# Patient Record
Sex: FEMALE | Race: WHITE | NOT HISPANIC OR LATINO | Employment: OTHER | URBAN - METROPOLITAN AREA
[De-identification: names, ages, dates, MRNs, and addresses within clinical notes are randomized per-mention and may not be internally consistent; named-entity substitution may affect disease eponyms.]

---

## 2019-12-10 ENCOUNTER — HOSPITAL ENCOUNTER (OUTPATIENT)
Facility: CLINIC | Age: 64
Setting detail: OBSERVATION
Discharge: HOME | End: 2019-12-11
Attending: EMERGENCY MEDICINE | Admitting: STUDENT IN AN ORGANIZED HEALTH CARE EDUCATION/TRAINING PROGRAM
Payer: COMMERCIAL

## 2019-12-10 ENCOUNTER — APPOINTMENT (OUTPATIENT)
Dept: CT IMAGING | Facility: CLINIC | Age: 64
End: 2019-12-10
Attending: EMERGENCY MEDICINE
Payer: COMMERCIAL

## 2019-12-10 DIAGNOSIS — J44.1 COPD EXACERBATION (H): Primary | ICD-10-CM

## 2019-12-10 DIAGNOSIS — J45.41 MODERATE PERSISTENT ASTHMA WITH EXACERBATION: ICD-10-CM

## 2019-12-10 DIAGNOSIS — K59.00 CONSTIPATION, UNSPECIFIED CONSTIPATION TYPE: ICD-10-CM

## 2019-12-10 DIAGNOSIS — J96.01 ACUTE RESPIRATORY FAILURE WITH HYPOXIA (H): ICD-10-CM

## 2019-12-10 DIAGNOSIS — R00.0 TACHYCARDIA: ICD-10-CM

## 2019-12-10 PROBLEM — Z86.73 HISTORY OF CVA (CEREBROVASCULAR ACCIDENT): Status: ACTIVE | Noted: 2019-12-10

## 2019-12-10 PROBLEM — C85.90 NON-HODGKIN LYMPHOMA (H): Status: ACTIVE | Noted: 2019-12-10

## 2019-12-10 PROBLEM — M32.14: Status: ACTIVE | Noted: 2019-12-10

## 2019-12-10 PROBLEM — M06.9 RHEUMATOID ARTHRITIS INVOLVING MULTIPLE SITES (H): Status: ACTIVE | Noted: 2017-06-14

## 2019-12-10 PROBLEM — G93.9 BRAIN LESION: Status: ACTIVE | Noted: 2017-03-26

## 2019-12-10 PROBLEM — Z98.890 S/P COLONOSCOPY WITH POLYPECTOMY: Status: ACTIVE | Noted: 2017-03-16

## 2019-12-10 PROBLEM — E66.01 MORBID OBESITY (H): Status: ACTIVE | Noted: 2017-03-27

## 2019-12-10 PROBLEM — N18.30 CKD (CHRONIC KIDNEY DISEASE) STAGE 3, GFR 30-59 ML/MIN (H): Status: ACTIVE | Noted: 2017-08-03

## 2019-12-10 PROBLEM — Z79.52 CURRENT CHRONIC USE OF SYSTEMIC STEROIDS: Status: ACTIVE | Noted: 2017-08-03

## 2019-12-10 LAB
ANION GAP SERPL CALCULATED.3IONS-SCNC: 4 MMOL/L (ref 3–14)
BASOPHILS # BLD AUTO: 0 10E9/L (ref 0–0.2)
BASOPHILS NFR BLD AUTO: 0.3 %
BUN SERPL-MCNC: 19 MG/DL (ref 7–30)
CALCIUM SERPL-MCNC: 9.8 MG/DL (ref 8.5–10.1)
CHLORIDE SERPL-SCNC: 101 MMOL/L (ref 94–109)
CO2 SERPL-SCNC: 29 MMOL/L (ref 20–32)
CREAT BLD-MCNC: 1 MG/DL (ref 0.52–1.04)
CREAT SERPL-MCNC: 0.94 MG/DL (ref 0.52–1.04)
DIFFERENTIAL METHOD BLD: ABNORMAL
EOSINOPHIL # BLD AUTO: 0.2 10E9/L (ref 0–0.7)
EOSINOPHIL NFR BLD AUTO: 1.5 %
ERYTHROCYTE [DISTWIDTH] IN BLOOD BY AUTOMATED COUNT: 16.2 % (ref 10–15)
GFR SERPL CREATININE-BSD FRML MDRD: 56 ML/MIN/{1.73_M2}
GFR SERPL CREATININE-BSD FRML MDRD: 64 ML/MIN/{1.73_M2}
GLUCOSE SERPL-MCNC: 103 MG/DL (ref 70–99)
HCT VFR BLD AUTO: 40.9 % (ref 35–47)
HGB BLD-MCNC: 12.9 G/DL (ref 11.7–15.7)
IMM GRANULOCYTES # BLD: 0 10E9/L (ref 0–0.4)
IMM GRANULOCYTES NFR BLD: 0.2 %
INR PPP: 2.49 (ref 0.86–1.14)
INTERPRETATION ECG - MUSE: NORMAL
LYMPHOCYTES # BLD AUTO: 1.2 10E9/L (ref 0.8–5.3)
LYMPHOCYTES NFR BLD AUTO: 10.6 %
MCH RBC QN AUTO: 26.7 PG (ref 26.5–33)
MCHC RBC AUTO-ENTMCNC: 31.5 G/DL (ref 31.5–36.5)
MCV RBC AUTO: 85 FL (ref 78–100)
MONOCYTES # BLD AUTO: 0.5 10E9/L (ref 0–1.3)
MONOCYTES NFR BLD AUTO: 4.5 %
NEUTROPHILS # BLD AUTO: 9.1 10E9/L (ref 1.6–8.3)
NEUTROPHILS NFR BLD AUTO: 82.9 %
NRBC # BLD AUTO: 0 10*3/UL
NRBC BLD AUTO-RTO: 0 /100
NT-PROBNP SERPL-MCNC: 106 PG/ML (ref 0–900)
PLATELET # BLD AUTO: 253 10E9/L (ref 150–450)
POTASSIUM SERPL-SCNC: 4.1 MMOL/L (ref 3.4–5.3)
RBC # BLD AUTO: 4.84 10E12/L (ref 3.8–5.2)
SODIUM SERPL-SCNC: 134 MMOL/L (ref 133–144)
TROPONIN I SERPL-MCNC: 0.04 UG/L (ref 0–0.04)
WBC # BLD AUTO: 10.9 10E9/L (ref 4–11)

## 2019-12-10 PROCEDURE — G0378 HOSPITAL OBSERVATION PER HR: HCPCS

## 2019-12-10 PROCEDURE — 25000125 ZZHC RX 250: Performed by: STUDENT IN AN ORGANIZED HEALTH CARE EDUCATION/TRAINING PROGRAM

## 2019-12-10 PROCEDURE — 25800030 ZZH RX IP 258 OP 636: Performed by: EMERGENCY MEDICINE

## 2019-12-10 PROCEDURE — 85610 PROTHROMBIN TIME: CPT | Performed by: EMERGENCY MEDICINE

## 2019-12-10 PROCEDURE — 25000132 ZZH RX MED GY IP 250 OP 250 PS 637: Mod: GY | Performed by: STUDENT IN AN ORGANIZED HEALTH CARE EDUCATION/TRAINING PROGRAM

## 2019-12-10 PROCEDURE — 96375 TX/PRO/DX INJ NEW DRUG ADDON: CPT

## 2019-12-10 PROCEDURE — 83880 ASSAY OF NATRIURETIC PEPTIDE: CPT | Performed by: EMERGENCY MEDICINE

## 2019-12-10 PROCEDURE — 94640 AIRWAY INHALATION TREATMENT: CPT | Mod: 76

## 2019-12-10 PROCEDURE — 40000275 ZZH STATISTIC RCP TIME EA 10 MIN

## 2019-12-10 PROCEDURE — 25000128 H RX IP 250 OP 636: Performed by: EMERGENCY MEDICINE

## 2019-12-10 PROCEDURE — 85025 COMPLETE CBC W/AUTO DIFF WBC: CPT | Performed by: EMERGENCY MEDICINE

## 2019-12-10 PROCEDURE — 93005 ELECTROCARDIOGRAM TRACING: CPT

## 2019-12-10 PROCEDURE — 84484 ASSAY OF TROPONIN QUANT: CPT | Performed by: STUDENT IN AN ORGANIZED HEALTH CARE EDUCATION/TRAINING PROGRAM

## 2019-12-10 PROCEDURE — 94640 AIRWAY INHALATION TREATMENT: CPT

## 2019-12-10 PROCEDURE — 71260 CT THORAX DX C+: CPT

## 2019-12-10 PROCEDURE — 25000132 ZZH RX MED GY IP 250 OP 250 PS 637: Mod: GY | Performed by: HOSPITALIST

## 2019-12-10 PROCEDURE — 25000125 ZZHC RX 250: Performed by: EMERGENCY MEDICINE

## 2019-12-10 PROCEDURE — 99285 EMERGENCY DEPT VISIT HI MDM: CPT | Mod: 25

## 2019-12-10 PROCEDURE — 82565 ASSAY OF CREATININE: CPT | Mod: 91

## 2019-12-10 PROCEDURE — 80048 BASIC METABOLIC PNL TOTAL CA: CPT | Performed by: EMERGENCY MEDICINE

## 2019-12-10 PROCEDURE — 96365 THER/PROPH/DIAG IV INF INIT: CPT | Mod: 59

## 2019-12-10 PROCEDURE — 99220 ZZC INITIAL OBSERVATION CARE,LEVL III: CPT | Performed by: STUDENT IN AN ORGANIZED HEALTH CARE EDUCATION/TRAINING PROGRAM

## 2019-12-10 PROCEDURE — 84484 ASSAY OF TROPONIN QUANT: CPT | Performed by: EMERGENCY MEDICINE

## 2019-12-10 RX ORDER — CHOLECALCIFEROL (VITAMIN D3) 50 MCG
2000 TABLET ORAL DAILY
COMMUNITY

## 2019-12-10 RX ORDER — LISINOPRIL 2.5 MG/1
2.5 TABLET ORAL DAILY
Status: DISCONTINUED | OUTPATIENT
Start: 2019-12-11 | End: 2019-12-11 | Stop reason: HOSPADM

## 2019-12-10 RX ORDER — IPRATROPIUM BROMIDE AND ALBUTEROL SULFATE 2.5; .5 MG/3ML; MG/3ML
3 SOLUTION RESPIRATORY (INHALATION)
Status: COMPLETED | OUTPATIENT
Start: 2019-12-10 | End: 2019-12-10

## 2019-12-10 RX ORDER — LEVOTHYROXINE SODIUM 88 UG/1
88 TABLET ORAL DAILY
Status: DISCONTINUED | OUTPATIENT
Start: 2019-12-11 | End: 2019-12-11 | Stop reason: HOSPADM

## 2019-12-10 RX ORDER — ACETAMINOPHEN 325 MG/1
325-650 TABLET ORAL EVERY 6 HOURS PRN
Status: DISCONTINUED | OUTPATIENT
Start: 2019-12-10 | End: 2019-12-11 | Stop reason: HOSPADM

## 2019-12-10 RX ORDER — ROSUVASTATIN CALCIUM 40 MG/1
40 TABLET, COATED ORAL AT BEDTIME
COMMUNITY

## 2019-12-10 RX ORDER — WARFARIN SODIUM 2.5 MG/1
2.5 TABLET ORAL
Status: COMPLETED | OUTPATIENT
Start: 2019-12-10 | End: 2019-12-10

## 2019-12-10 RX ORDER — ACETAMINOPHEN 325 MG/1
325-650 TABLET ORAL EVERY 6 HOURS PRN
COMMUNITY

## 2019-12-10 RX ORDER — DIPHENHYDRAMINE HCL 25 MG
25 TABLET ORAL
COMMUNITY

## 2019-12-10 RX ORDER — LISINOPRIL 2.5 MG/1
2.5 TABLET ORAL DAILY
COMMUNITY

## 2019-12-10 RX ORDER — FUROSEMIDE 40 MG
40 TABLET ORAL
Status: DISCONTINUED | OUTPATIENT
Start: 2019-12-10 | End: 2019-12-11 | Stop reason: HOSPADM

## 2019-12-10 RX ORDER — DIPHENHYDRAMINE HCL 25 MG
25 CAPSULE ORAL
Status: COMPLETED | OUTPATIENT
Start: 2019-12-10 | End: 2019-12-10

## 2019-12-10 RX ORDER — LEVALBUTEROL 1.25 MG/.5ML
1.25 SOLUTION, CONCENTRATE RESPIRATORY (INHALATION) 4 TIMES DAILY
Status: DISCONTINUED | OUTPATIENT
Start: 2019-12-10 | End: 2019-12-11

## 2019-12-10 RX ORDER — ALENDRONATE SODIUM 70 MG/1
70 TABLET ORAL
COMMUNITY

## 2019-12-10 RX ORDER — METHYLPREDNISOLONE SODIUM SUCCINATE 125 MG/2ML
125 INJECTION, POWDER, LYOPHILIZED, FOR SOLUTION INTRAMUSCULAR; INTRAVENOUS ONCE
Status: COMPLETED | OUTPATIENT
Start: 2019-12-10 | End: 2019-12-10

## 2019-12-10 RX ORDER — LIDOCAINE 40 MG/G
CREAM TOPICAL
Status: DISCONTINUED | OUTPATIENT
Start: 2019-12-10 | End: 2019-12-11 | Stop reason: HOSPADM

## 2019-12-10 RX ORDER — MAGNESIUM SULFATE HEPTAHYDRATE 40 MG/ML
2 INJECTION, SOLUTION INTRAVENOUS ONCE
Status: COMPLETED | OUTPATIENT
Start: 2019-12-10 | End: 2019-12-10

## 2019-12-10 RX ORDER — OXYCODONE HYDROCHLORIDE 5 MG/1
5 TABLET ORAL EVERY 4 HOURS PRN
Status: DISCONTINUED | OUTPATIENT
Start: 2019-12-10 | End: 2019-12-11 | Stop reason: HOSPADM

## 2019-12-10 RX ORDER — IOPAMIDOL 755 MG/ML
80 INJECTION, SOLUTION INTRAVASCULAR ONCE
Status: COMPLETED | OUTPATIENT
Start: 2019-12-10 | End: 2019-12-10

## 2019-12-10 RX ORDER — ALBUTEROL SULFATE 0.83 MG/ML
3 SOLUTION RESPIRATORY (INHALATION)
Status: DISCONTINUED | OUTPATIENT
Start: 2019-12-10 | End: 2019-12-10

## 2019-12-10 RX ORDER — IPRATROPIUM BROMIDE AND ALBUTEROL SULFATE 2.5; .5 MG/3ML; MG/3ML
1 SOLUTION RESPIRATORY (INHALATION) 4 TIMES DAILY
COMMUNITY

## 2019-12-10 RX ORDER — LEVOTHYROXINE SODIUM 88 UG/1
88 TABLET ORAL DAILY
COMMUNITY

## 2019-12-10 RX ORDER — POTASSIUM CHLORIDE 1500 MG/1
20 TABLET, EXTENDED RELEASE ORAL 2 TIMES DAILY
COMMUNITY
End: 2024-02-22

## 2019-12-10 RX ORDER — IPRATROPIUM BROMIDE AND ALBUTEROL SULFATE 2.5; .5 MG/3ML; MG/3ML
1 SOLUTION RESPIRATORY (INHALATION) 4 TIMES DAILY
Status: DISCONTINUED | OUTPATIENT
Start: 2019-12-10 | End: 2019-12-10

## 2019-12-10 RX ORDER — PREDNISONE 20 MG/1
40 TABLET ORAL DAILY
Status: DISCONTINUED | OUTPATIENT
Start: 2019-12-10 | End: 2019-12-11 | Stop reason: HOSPADM

## 2019-12-10 RX ORDER — LANOLIN ALCOHOL/MO/W.PET/CERES
400 CREAM (GRAM) TOPICAL DAILY
COMMUNITY
End: 2021-01-02

## 2019-12-10 RX ORDER — NALOXONE HYDROCHLORIDE 0.4 MG/ML
.1-.4 INJECTION, SOLUTION INTRAMUSCULAR; INTRAVENOUS; SUBCUTANEOUS
Status: DISCONTINUED | OUTPATIENT
Start: 2019-12-10 | End: 2019-12-11 | Stop reason: HOSPADM

## 2019-12-10 RX ORDER — IPRATROPIUM BROMIDE AND ALBUTEROL SULFATE 2.5; .5 MG/3ML; MG/3ML
3 SOLUTION RESPIRATORY (INHALATION)
Status: DISCONTINUED | OUTPATIENT
Start: 2019-12-10 | End: 2019-12-10

## 2019-12-10 RX ADMIN — SODIUM CHLORIDE 500 ML: 9 INJECTION, SOLUTION INTRAVENOUS at 15:50

## 2019-12-10 RX ADMIN — FUROSEMIDE 40 MG: 40 TABLET ORAL at 18:04

## 2019-12-10 RX ADMIN — IOPAMIDOL 80 ML: 755 INJECTION, SOLUTION INTRAVENOUS at 12:16

## 2019-12-10 RX ADMIN — IPRATROPIUM BROMIDE AND ALBUTEROL SULFATE 3 ML: .5; 3 SOLUTION RESPIRATORY (INHALATION) at 11:38

## 2019-12-10 RX ADMIN — DIPHENHYDRAMINE HYDROCHLORIDE 25 MG: 25 CAPSULE ORAL at 23:00

## 2019-12-10 RX ADMIN — LEVALBUTEROL HYDROCHLORIDE 1.25 MG: 1.25 SOLUTION, CONCENTRATE RESPIRATORY (INHALATION) at 20:05

## 2019-12-10 RX ADMIN — MAGNESIUM SULFATE HEPTAHYDRATE 2 G: 40 INJECTION, SOLUTION INTRAVENOUS at 11:29

## 2019-12-10 RX ADMIN — IPRATROPIUM BROMIDE AND ALBUTEROL SULFATE 3 ML: .5; 3 SOLUTION RESPIRATORY (INHALATION) at 11:19

## 2019-12-10 RX ADMIN — SODIUM CHLORIDE 70 ML: 9 INJECTION, SOLUTION INTRAVENOUS at 12:16

## 2019-12-10 RX ADMIN — METHYLPREDNISOLONE SODIUM SUCCINATE 125 MG: 125 INJECTION, POWDER, FOR SOLUTION INTRAMUSCULAR; INTRAVENOUS at 11:27

## 2019-12-10 RX ADMIN — WARFARIN SODIUM 2.5 MG: 2.5 TABLET ORAL at 19:25

## 2019-12-10 RX ADMIN — IPRATROPIUM BROMIDE AND ALBUTEROL SULFATE 3 ML: .5; 3 SOLUTION RESPIRATORY (INHALATION) at 11:31

## 2019-12-10 RX ADMIN — SODIUM CHLORIDE 500 ML: 9 INJECTION, SOLUTION INTRAVENOUS at 11:26

## 2019-12-10 ASSESSMENT — ENCOUNTER SYMPTOMS
ABDOMINAL PAIN: 0
HEADACHES: 0
SHORTNESS OF BREATH: 1
NECK PAIN: 0

## 2019-12-10 ASSESSMENT — ACTIVITIES OF DAILY LIVING (ADL)
COGNITION: 1 - ATTENTION OR MEMORY DEFICITS
BATHING: 1-->ASSISTIVE EQUIPMENT
AMBULATION: 1-->ASSISTIVE EQUIPMENT
DRESS: 1-->ASSISTIVE EQUIPMENT
SWALLOWING: 0-->SWALLOWS FOODS/LIQUIDS WITHOUT DIFFICULTY
TOILETING: 1-->ASSISTIVE EQUIPMENT
RETIRED_EATING: 0-->INDEPENDENT
WHICH_OF_THE_ABOVE_FUNCTIONAL_RISKS_HAD_A_RECENT_ONSET_OR_CHANGE?: AMBULATION;TRANSFERRING;BATHING;DRESSING
TRANSFERRING: 1-->ASSISTIVE EQUIPMENT
RETIRED_COMMUNICATION: 0-->UNDERSTANDS/COMMUNICATES WITHOUT DIFFICULTY
FALL_HISTORY_WITHIN_LAST_SIX_MONTHS: NO

## 2019-12-10 ASSESSMENT — MIFFLIN-ST. JEOR: SCORE: 1287.97

## 2019-12-10 NOTE — H&P
[facility]    History and Physical - Hospitalist Service       Date of Admission:  12/10/2019    Assessment & Plan   Sho Hernandez is a 64 year old female admitted on 12/10/2019. She presents with SOB.     SOB  Wheezing  Mild COPD Exacerbation  Assessment: presents with 2-3 day hx of progressive dyspnea on exertion. CT shows no No evidence of aortic dissection and no consolidation was seen. Possible reactive airway disease vs bronchitis vs asthma exacerbation. Hemodynamics stable, saturating well on RA. Not significantly fluid overloaded.   Plan:  - admit to observation  - Xopenex QID given tachycardia  - Prednisone burst, 40 mg daily  - Supplemental O2 as needed  - Follow vitals/temp    Back Pain  Assessment: CT chest does show multiple compression deformities in the lower thoracic spine and endplate deformities in the lumbar spine  Plan:  - Pain control as needed  - Orthopedic surgery evaluation  - Fall precautions  - PT eval    Hypertension   Assessment: Takes Lisinopril 2.5 mg daily. BP stable on admission  Plan:   - Continue PTA Lisinopril in AM with hold parameters    Hyperlipidemia   Assessment: On Crestor 40 mg qHS as otupatient  Plan:  - continue PTA statin at dischagre    Hypothyroidism   Assessment: On synthroid 175 mcg daily.  Plan:   - Continue PTA synthroid replacement    Systemic lupus erythematosus   Assessment: Takes Prednisone 5 mg daily.  Plan:   - Continue prednisone, started on burst for COPD exacerbation    Membranous lupus nephritis syndrome  Nephrotic Sydndrome  Assessment: Creatinine on admission 1.0 which is at baseline, patient is on prednisone for SLE. Followed with nephrology and rheum in the past   Plan:   - Avoid NSAIDs/nephrotoxins    CVA with H/O of L MCA embolic stroke   Lupus anticoagulant positive   Assessment: PTA patient is on coumadin. Aphasia and comprehension deficits at baseline.   Plan:   - Continue Coumadin, pharmacy to dose  - Follow Chromogenic factor  X    H/O: Non-Hodgkin lymphoma  H/O: Adenocarcinoma of right lung s/p partial lobectomy of lung   Assessment: Follows with Dr. Mcmahon as outpatient.   Plan:   - noted, NTD        Diet: Regular Diet Adult    DVT Prophylaxis: Warfarin  Gregg Catheter: not present  Code Status: Full Code      Disposition Plan   Expected discharge: Tomorrow, recommended to prior living arrangement once respiratory status improved.  Entered: Austin Caballero MD 12/10/2019, 5:34 PM     The patient's care was discussed with the Patient and Patient's Family.    Austin Caballero MD  [facility]    ______________________________________________________________________    Chief Complaint     SOB    History is obtained from the patient    History of Present Illness      Sho Hernandez is a 64 y.o. female with a history of HTN, HLD, L MCA stroke with residual aphasia, lupus anticoagulant positive, SLE, and hypothyroidism who presents with SOB.    Patient reports that for the past 2-3  days, she has had increasing dyspnea with progressive left shoulder pain radiating to her back and lower back pain.  She is tried taking Tylenol at home without any significant relief.  She is also using nebulizers 4 times today for her dyspnea with no improvement.  She denies any radiation of the chest pain into her neck/jaw/arm.  She denies any nausea/vomiting abdominal pain.  She takes warfarin daily.  She denies any issues with compliance.  She denies any recent fevers or chills.  Her  reports that she is much more dyspneic than she has ever been.  Patient denies any calf pain or leg swelling.  She denies any PND/orthopnea.  She denies any recent sick contacts.  She does endorse feeling congested, has a nonproductive cough.  No recent blood in her stool, no weight loss or night sweats.  No urinary complaints.  No recent falls, recent head trauma.  No new weakness or numbness, no new headaches.  Patient has no other complaints this time.    Review  of Systems      The 10 point Review of Systems is negative other than noted in the HPI or here.     Past Medical History    I have reviewed this patient's medical history and updated it with pertinent information if needed.   Past Medical History:   Diagnosis Date     Cancer (H) 03/05/2014    lung       Past Surgical History   I have reviewed this patient's surgical history and updated it with pertinent information if needed.  Past Surgical History:   Procedure Laterality Date     THORACIC SURGERY  03/27/2014    2 sections of right lung     Social History   I have reviewed this patient's social history and updated it with pertinent information if needed.  Social History     Tobacco Use     Smoking status: Former Smoker     Packs/day: 0.50     Start date: 1/1/2014     Smokeless tobacco: Never Used     Tobacco comment: 5 cigarettes per day - 11/30/14: has quit completely   Substance Use Topics     Alcohol use: No     Drug use: No       Family History   I have reviewed this patient's family history and updated it with pertinent information if needed.   Family History   Problem Relation Age of Onset     Diabetes Father      Prior to Admission Medications   Prior to Admission Medications   Prescriptions Last Dose Informant Patient Reported? Taking?   acetaminophen (TYLENOL) 325 MG tablet  at PRN Son Yes Yes   Sig: Take 325-650 mg by mouth every 6 hours as needed for mild pain   alendronate (FOSAMAX) 70 MG tablet 12/8/2019 at ANGELA Son Yes Yes   Sig: Take 70 mg by mouth every 7 days   diphenhydrAMINE (BENADRYL) 25 MG tablet 12/9/2019 at HS Son Yes Yes   Sig: Take 25 mg by mouth nightly as needed for sleep   folic acid (FOLVITE) 400 MCG tablet 12/10/2019 at AM Son Yes Yes   Sig: Take 400 mcg by mouth daily   furosemide (LASIX) 40 MG tablet 12/10/2019 at x1 Son Yes Yes   Sig: Take 40 mg by mouth 2 times daily.   ipratropium - albuterol 0.5 mg/2.5 mg/3 mL (DUONEB) 0.5-2.5 (3) MG/3ML neb solution 12/10/2019 at x1 Son Yes Yes    Sig: Take 1 vial by nebulization 4 times daily   levothyroxine (SYNTHROID/LEVOTHROID) 88 MCG tablet 12/10/2019 at AM Son Yes Yes   Sig: Take 88 mcg by mouth daily   lisinopril (PRINIVIL/ZESTRIL) 2.5 MG tablet 12/10/2019 at AM Son Yes Yes   Sig: Take 2.5 mg by mouth daily   potassium chloride ER (K-DUR/KLOR-CON M) 20 MEQ CR tablet 12/10/2019 at x1 Son Yes Yes   Sig: Take 20 mEq by mouth 2 times daily   predniSONE (DELTASONE) 5 MG tablet  Son Yes No   Sig: Take 5 mg by mouth daily    pyridOXINE (VITAMIN B6) 100 MG TABS 12/10/2019 at AM Son Yes Yes   Sig: Take 100 mg by mouth daily   rosuvastatin (CRESTOR) 40 MG tablet 12/9/2019 at HS Son Yes Yes   Sig: Take 40 mg by mouth At Bedtime   vitamin B-12 (CYANOCOBALAMIN) 500 MCG tablet 12/10/2019 at AM Son Yes Yes   Sig: Take 500 mcg by mouth daily   vitamin D3 (CHOLECALCIFEROL) 2000 units (50 mcg) tablet 12/10/2019 at AM Son Yes Yes   Sig: Take 2,000 Units by mouth daily   warfarin (COUMADIN) 5 MG tablet 12/9/2019 at PM Son Yes Yes   Sig: Take by mouth daily -  For history of stroke   Goal CFX 20-40%  5 mg on Thursday  2.5 mg on all other days      Facility-Administered Medications: None     Allergies   Allergies   Allergen Reactions     Levaquin [Levofloxacin]      Penicillins Unknown       Physical Exam   Vital Signs: Temp: 97.1  F (36.2  C) Temp src: Temporal BP: 136/67 Pulse: 112 Heart Rate: 115 Resp: 18 SpO2: 97 % O2 Device: None (Room air)    Weight: 180 lbs 0 oz    Constitutional: awake, alert, cooperative, no apparent distress.   Eyes: Lids and lashes normal, pupils equal, round and reactive to light   ENT: Normocephalic, without obvious abnormality, atraumatic, sinuses nontender on palpation   Hematologic / Lymphatic: no cervical lymphadenopathy   Respiratory: CTABL   Cardiovascular: RRR with no m/r/g   GI: Normal bowel sounds, soft, non-distended, non-tender. Skin: normal skin color, texture, turgor   Musculoskeletal: There is no redness, warmth, or swelling of  the joints. Full range of motion noted.   Neurologic: Awake, alert. Aphasic. Cranial nerves II-XII are grossly intact. Motor is 5 out of 5 bilaterally. Sensory is intact.   Neuropsychiatric: normal mood and affect      Data   Data reviewed today: I reviewed all medications, new labs and imaging results over the last 24 hours. I personally reviewed the chest CT image(s) showing see below.    Most Recent 3 CBC's:  Recent Labs   Lab Test 12/10/19  1113   WBC 10.9   HGB 12.9   MCV 85        Most Recent 3 BMP's:  Recent Labs   Lab Test 12/10/19  1113 11/30/12  1415    138   POTASSIUM 4.1 3.5   CHLORIDE 101 106   CO2 29 28   BUN 19 23   CR 0.94 1.08*   ANIONGAP 4 4*   PATRICIA 9.8 8.9   * 101*     Most Recent 3 Troponin's:No lab results found.  Most Recent 3 BNP's:  Recent Labs   Lab Test 12/10/19  1113   NTBNPI 106     Recent Results (from the past 24 hour(s))   CT Aortic Survey w Contrast    Narrative    CT AORTIC SURVEY WITH CONTRAST December 10, 2019 12:21 PM     HISTORY: Chest pain radiating to back. Shortness of breath,  tachycardia.    TECHNIQUE: 80mL Isovue-370. CT images of the chest, abdomen, and  pelvis after nonionic intravenous contrast. Radiation dose for this  scan was reduced using automated exposure control, adjustment of the  mA and/or kV according to patient size, or iterative reconstruction  technique.    COMPARISON: None.    FINDINGS:     Aorta: Thoracic aortic caliber within normal limits. No aortic  dissection. Conventional three-vessel anatomy of the aortic arch. Mild  thoracic aortic atherosclerosis. There is moderate abdominal aortic  atherosclerosis without evidence of dissection. Visceral branches are  mildly stenotic at their origin but otherwise patent. There is severe  atherosclerosis at the aortic bifurcation, likely causing high-grade  stenosis.    Chest: Previous wedge resection in the upper right lung. Minor  atelectasis left lung base. There are irregular ground-glass  opacities  in the left upper lobe that are nonspecific. Mild emphysematous  changes. No pleural or pericardial effusion. Moderate coronary  atherosclerosis.    Abdomen/Pelvis: There are two hemangiomas in the inferior aspect of  the liver. Calcified gallstones are present. The spleen, pancreas,  adrenal glands, and right kidney are unremarkable. There is a  nonobstructing 2 mm calculus in left kidney. Small hiatal hernia is  present. No abdominal or retroperitoneal lymphadenopathy. No bowel  obstruction, free air, or ascites. There is diastases recti. No pelvic  adenopathy, free fluid, or mass.    Healed right-sided pubic rami fractures are noted. There are multiple  compression deformities in the lower thoracic spine with increased  thoracic kyphosis. There is also superior endplate deformity of L3 and  inferior endplate deformity at L1.      Impression    IMPRESSION:  1. No evidence of aortic dissection.  2. Moderate coronary atherosclerosis.  3. Probable high-grade stenosis at the aortic bifurcation.  4. Multiple compression deformities in the lower thoracic spine and  endplate deformities in the lumbar spine. If there is concern for  acute compression fracture, consider MRI.    GENE ARCOS MD

## 2019-12-10 NOTE — PLAN OF CARE
Pt arrived to unit around 1700.  AO4, slower to respond.  Having some lower back pain, uses Tylenol.  Pt reports no SOB.  VSS on RA.  Regular diet, tolerating well.  A1-2 GB.  +CMS.

## 2019-12-10 NOTE — ED TRIAGE NOTES
Increasing SOB with back pain since Sunday night. Sats in 80s at clinic. RR 30s.  Hx partial lobotomy R lung 2015.  Not on any cancer tx now.  Hx stroke with aphasia.    Had a neb tx in clinic before sending here.

## 2019-12-10 NOTE — ED NOTES
Patient reports back pain and SOB since Sunday. States SOB started Sunday night. Patient sent by clinic.

## 2019-12-10 NOTE — PHARMACY-ADMISSION MEDICATION HISTORY
Pharmacy Medication History  Admission medication history interview status for the 12/10/2019  admission is complete. See EPIC admission navigator for prior to admission medications     Medication history sources: Patient's family/friend (son)  Medication history source reliability: Good  Adherence assessment: Good    Medication reconciliation completed by provider prior to medication history? No    Time spent in this activity: 15 minutes      Prior to Admission medications    Medication Sig Last Dose Taking? Auth Provider   acetaminophen (TYLENOL) 325 MG tablet Take 325-650 mg by mouth every 6 hours as needed for mild pain  at PRN Yes Unknown, Entered By History   alendronate (FOSAMAX) 70 MG tablet Take 70 mg by mouth every 7 days 12/8/2019 at ANGELA Yes Unknown, Entered By History   diphenhydrAMINE (BENADRYL) 25 MG tablet Take 25 mg by mouth nightly as needed for sleep 12/9/2019 at HS Yes Unknown, Entered By History   folic acid (FOLVITE) 400 MCG tablet Take 400 mcg by mouth daily 12/10/2019 at AM Yes Unknown, Entered By History   furosemide (LASIX) 40 MG tablet Take 40 mg by mouth 2 times daily. 12/10/2019 at x1 Yes Reported, Patient   ipratropium - albuterol 0.5 mg/2.5 mg/3 mL (DUONEB) 0.5-2.5 (3) MG/3ML neb solution Take 1 vial by nebulization 4 times daily 12/10/2019 at x1 Yes Unknown, Entered By History   levothyroxine (SYNTHROID/LEVOTHROID) 88 MCG tablet Take 88 mcg by mouth daily 12/10/2019 at AM Yes Unknown, Entered By History   lisinopril (PRINIVIL/ZESTRIL) 2.5 MG tablet Take 2.5 mg by mouth daily 12/10/2019 at AM Yes Unknown, Entered By History   potassium chloride ER (K-DUR/KLOR-CON M) 20 MEQ CR tablet Take 20 mEq by mouth 2 times daily 12/10/2019 at x1 Yes Unknown, Entered By History   pyridOXINE (VITAMIN B6) 100 MG TABS Take 100 mg by mouth daily 12/10/2019 at AM Yes Unknown, Entered By History   rosuvastatin (CRESTOR) 40 MG tablet Take 40 mg by mouth At Bedtime 12/9/2019 at HS Yes Unknown, Entered By  History   vitamin B-12 (CYANOCOBALAMIN) 500 MCG tablet Take 500 mcg by mouth daily 12/10/2019 at AM Yes Unknown, Entered By History   vitamin D3 (CHOLECALCIFEROL) 2000 units (50 mcg) tablet Take 2,000 Units by mouth daily 12/10/2019 at AM Yes Unknown, Entered By History   warfarin (COUMADIN) 5 MG tablet Take by mouth daily -  For history of stroke   Goal CFX 20-40%  5 mg on Thursday  2.5 mg on all other days 12/9/2019 at PM Yes Reported, Patient   predniSONE (DELTASONE) 5 MG tablet Take 5 mg by mouth daily    Reported, Patient

## 2019-12-10 NOTE — ED NOTES
"[facility]  ED Nurse Handoff Report    ED Chief complaint: Shortness of Breath      ED Diagnosis:   Final diagnoses:   Tachycardia   Acute respiratory failure with hypoxia (H)   Moderate persistent asthma with exacerbation       Code Status: Full Code, confirm with hospitalist    Allergies:   Allergies   Allergen Reactions     Levaquin [Levofloxacin]      Penicillins Unknown       Activity level - Baseline/Home:  Independent  Activity Level - Current:   Stand with Assist    Patient's Preferred language: English   Needed?: No    Isolation: No  Infection: Not Applicable  Bariatric?: No    Vital Signs:   Vitals:    12/10/19 1300 12/10/19 1315 12/10/19 1330 12/10/19 1345   BP:       Resp: 15 9 16 16   Temp:       TempSrc:       SpO2: 98% 97% 95% 97%   Weight:       Height:           Cardiac Rhythm: ,   Cardiac  Cardiac Rhythm: Sinus tachycardia    Pain level:      Is this patient confused?: No   Does this patient have a guardian?  No         If yes, is there guardianship documents in the Epic \"Code/ACP\" activity?  No         Guardian Notified?  N/A  [place] - Suicide Severity Rating Scale Completed?  No, secondary to n/a  If yes, what color did the patient score?  N/A    Patient Report: Initial Complaint: Increasing shortness of breath and back pain.   Focused Assessment: Pt noted to be tachypnic and tachycardic on arrival from clinic. Reports difficulty breathing, especially with exertion and back pain. Pt has compression fractures. History of stroke with aphasia. Pt is able to express self through writing. Up to commode during ED stay with SBA.   Tests Performed: CT, labs  Abnormal Results:   Results for orders placed or performed during the hospital encounter of 12/10/19   CBC with platelets differential     Status: Abnormal   Result Value Ref Range    WBC 10.9 4.0 - 11.0 10e9/L    RBC Count 4.84 3.8 - 5.2 10e12/L    Hemoglobin 12.9 11.7 - 15.7 g/dL    Hematocrit 40.9 35.0 - 47.0 %    MCV " 85 78 - 100 fl    MCH 26.7 26.5 - 33.0 pg    MCHC 31.5 31.5 - 36.5 g/dL    RDW 16.2 (H) 10.0 - 15.0 %    Platelet Count 253 150 - 450 10e9/L    Diff Method Automated Method     % Neutrophils 82.9 %    % Lymphocytes 10.6 %    % Monocytes 4.5 %    % Eosinophils 1.5 %    % Basophils 0.3 %    % Immature Granulocytes 0.2 %    Nucleated RBCs 0 0 /100    Absolute Neutrophil 9.1 (H) 1.6 - 8.3 10e9/L    Absolute Lymphocytes 1.2 0.8 - 5.3 10e9/L    Absolute Monocytes 0.5 0.0 - 1.3 10e9/L    Absolute Eosinophils 0.2 0.0 - 0.7 10e9/L    Absolute Basophils 0.0 0.0 - 0.2 10e9/L    Abs Immature Granulocytes 0.0 0 - 0.4 10e9/L    Absolute Nucleated RBC 0.0    Basic metabolic panel     Status: Abnormal   Result Value Ref Range    Sodium 134 133 - 144 mmol/L    Potassium 4.1 3.4 - 5.3 mmol/L    Chloride 101 94 - 109 mmol/L    Carbon Dioxide 29 20 - 32 mmol/L    Anion Gap 4 3 - 14 mmol/L    Glucose 103 (H) 70 - 99 mg/dL    Urea Nitrogen 19 7 - 30 mg/dL    Creatinine 0.94 0.52 - 1.04 mg/dL    GFR Estimate 64 >60 mL/min/[1.73_m2]    GFR Estimate If Black 74 >60 mL/min/[1.73_m2]    Calcium 9.8 8.5 - 10.1 mg/dL   BNP     Status: None   Result Value Ref Range    N-Terminal Pro BNP Inpatient 106 0 - 900 pg/mL   INR     Status: Abnormal   Result Value Ref Range    INR 2.49 (H) 0.86 - 1.14   Creatinine POCT     Status: Abnormal   Result Value Ref Range    Creatinine 1.0 0.52 - 1.04 mg/dL    GFR Estimate 56 (L) >60 mL/min/[1.73_m2]    GFR Estimate If Black 68 >60 mL/min/[1.73_m2]   EKG 12 lead     Status: None (Preliminary result)   Result Value Ref Range    Interpretation ECG Click View Image link to view waveform and result      CT Aortic Survey w Contrast   Final Result   IMPRESSION:   1. No evidence of aortic dissection.   2. Moderate coronary atherosclerosis.   3. Probable high-grade stenosis at the aortic bifurcation.   4. Multiple compression deformities in the lower thoracic spine and   endplate deformities in the lumbar spine. If  there is concern for   acute compression fracture, consider MRI.      GENE ARCOS MD          Treatments provided: Telemetry, monitoring, medication, fluids    Family Comments: Family at bedside    OBS brochure/video discussed/provided to patient/family: No              Name of person given brochure if not patient: n/a              Relationship to patient: n/a    ED Medications:   Medications   0.9% sodium chloride BOLUS (has no administration in time range)   0.9% sodium chloride BOLUS (0 mLs Intravenous Stopped 12/10/19 1203)   magnesium sulfate 2 g in water intermittent infusion (0 g Intravenous Stopped 12/10/19 1203)   methylPREDNISolone sodium succinate (solu-MEDROL) injection 125 mg (125 mg Intravenous Given 12/10/19 1127)   ipratropium - albuterol 0.5 mg/2.5 mg/3 mL (DUONEB) neb solution 3 mL (3 mLs Nebulization Given 12/10/19 1138)   Saline Flush (70 mLs Intravenous Given 12/10/19 1216)   iopamidol (ISOVUE-370) solution 80 mL (80 mLs Intravenous Given 12/10/19 1216)       Drips infusing?:  No    For the majority of the shift this patient was Green.   Interventions performed were n/a.    Severe Sepsis OR Septic Shock Diagnosis Present: No    To be done/followed up on inpatient unit:  Continue plan of care    ED NURSE PHONE NUMBER: [id number]

## 2019-12-10 NOTE — PROGRESS NOTES
PRIOR TO DISCHARGE     Comments: List all goals to be met before discharge home:   - Improvement of Peak Flow to greater than 70% sustained off nebulizer for 4 hours. Not met  - Dyspnea improved and oxygen saturations greater than 88% on room air or prior home oxygen levels Met  - Vitals signs normal or at patient baseline Met  - Safe disposition plan has been identified Not met  - Nurse to notify provider when observation goals have been met and patient is ready for discharge.

## 2019-12-10 NOTE — ED PROVIDER NOTES
History     Chief Complaint:  Shortness of Breath    The history is provided by the patient, the spouse and a relative.      Sho Hernandez is a 64 year old female with a history of systemic lupus erythematosus, lung adenocarcinoma s/p right upper lobe lung biopsy, CVA in 2013 with associated right sided deficits and aphasia, anticoagulated on Coumadin, who presents from the [facility] for evaluation of shortness of breath. The patient's SATs were in the 80s in clinic and she received a nebulizer treatment prior to her arrival here. The patient reports increasing shortness of breath and progressively worsening left shoulder pain radiating to her back since the evening of . The patient has taken Tylenol without relief. The patient has uses her nebulizer 4 times her day for her symptoms. She denies right sided chest pain, abdominal pain, neck pain, headache, or leg swelling. The patient takes a full tablet of Coumadin on  and a half tablet every other day.     Allergies:  Levaquin  Penicillins       Medications:    Diltiazem  Lasix  Lisinopril  Nystatin  Plaquenil  Potassium chloride  Prednisone  Crestor  Synthroid  Coumadin    Past Medical History:    Systemic lupus erythematosus  Hyperlipidemia  Hypertension  Hypothyroidism   Lymphoma   Pelvic fractures  Membranous lupus nephritis syndrome  CVA, 2013  Aphasia   Streptococcus bovis infection   Seizures  Anxiety  Depression  Anemia  Rheumatoid arthritis  Adenocarcinoma, lung    Past Surgical History:     section   Left eye surgery  Right upper lobe lung biopsy   Breast biopsy  Colonoscopy     Family History:    Father: diabetes  Mother: hypertension     Social History:  The patient was accompanied to the ED by her  and daughter.  [facility], Dr. Clarissa Poe  Smoking Status: Former Smoker  Smokeless Tobacco: Never Used  Alcohol Use: Negative   Drug Use: Negative   Marital Status:    [2]     Review of Systems   Respiratory: Positive for shortness of breath.    Cardiovascular: Negative for chest pain and leg swelling.   Gastrointestinal: Negative for abdominal pain.   Musculoskeletal: Negative for neck pain.        Shoulder pain   Neurological: Negative for headaches.   All other systems reviewed and are negative.      Physical Exam     Patient Vitals for the past 24 hrs:   BP Temp Temp src Heart Rate Resp SpO2 Height Weight   12/10/19 1345 -- -- -- 118 16 97 % -- --   12/10/19 1330 -- -- -- 121 16 95 % -- --   12/10/19 1315 -- -- -- 117 9 97 % -- --   12/10/19 1300 -- -- -- 117 15 98 % -- --   12/10/19 1245 -- -- -- 117 10 96 % -- --   12/10/19 1230 -- -- -- 125 (!) 31 97 % -- --   12/10/19 1200 -- -- -- 121 14 98 % -- --   12/10/19 1145 -- -- -- 111 24 98 % -- --   12/10/19 1130 -- -- -- 114 16 100 % -- --   12/10/19 1046 (!) 143/76 97.1  F (36.2  C) Temporal 130 28 99 % 1.524 m (5') 81.6 kg (180 lb)        Physical Exam  Constitutional: Alert, attentive, GCS 15  HENT:    Nose: Nose normal.    Mouth/Throat: Oropharynx is clear, mucous membranes are dry  Eyes: EOM are normal, anicteric, conjugate gaze  CV: Tachycardic rate and rhythm; no murmurs  Chest: Poor air movmenet bilaterally, prolonged expiratory phase with wheezing  GI:  non tender. No distension. No guarding or rebound.    MSK: No LE edema, no tenderness to palpation of BLE.  Neurological: Alert, attentive, moving all extremities equally. Aphasia, right sided geovanna-paresis.  Skin: Skin is warm and dry.    Emergency Department Course   ECG:  Time: 1102  Vent. Rate 129 bpm. TN interval 146. QRS duration 124. QT/QTc 314/460. P-R-T axis 43 -15 1.  Sinus tachycardia  Right bundle branch block   Abnormal ECG  No STEMI  Read time: 1102    Imaging:  Radiographic findings were communicated with the patient who voiced understanding of the findings.    CT Aortic Survey w Contrast  IMPRESSION:  1. No evidence of aortic dissection.  2. Moderate  coronary atherosclerosis.  3. Probable high-grade stenosis at the aortic bifurcation.  4. Multiple compression deformities in the lower thoracic spine and  endplate deformities in the lumbar spine. If there is concern for  acute compression fracture, consider MRI. Reading per radiology     Laboratory:  CBC: WBC: 10.1, HGB 12.9, PLT: 253    BMP: Glucose 103 (H), o/w WNL (Creatinine: 0.94)    INR: 2.49 (H)    BNP: 106    Creatinine: Creatinine 1.0, GFR Estimate 56 (L)    Interventions:  1119 Duoneb 3 mL Nebulization  1126  mL IV Bolus  1127 Solu-Medrol 125 mg IV  1129 Magnesium sulfate 2 g IV  1131 Duoneb 3 mL Nebulization  1138 Duoneb 3 mL Nebulization  1550  mL IV     Emergency Department Course:   Past medical records, nursing notes, and vitals reviewed.  1059: I performed an exam of the patient and obtained history, as documented above.    EKG obtained in the ED, see results above.      The patient was sent for a CT Aortic Survey while in the emergency department, results above.      IV was inserted and blood was drawn for laboratory testing, results above.     Medication and IV fluids administered.     1522 I spoke with Dr. Odonnell, hospitalist, who agreed to admit the patient.    Findings and plan explained to the Patient who consents to admission. Discussed the patient with Dr. Caballero, who will admit the patient to a Observation bed for further monitoring, evaluation, and treatment.      I personally reviewed the laboratory results with the Patient and answered all related questions prior to admission.     Impression & Plan    Medical Decision Makin-year-old woman with past medical history significant for CVA, on anticoagulation, lupus on prednisone/Plaquenil asthma presenting for evaluation of several days cough and increased shortness of breath.  Of note, patient was seen in clinic with sats of 88% and transferred here.  She was given steroids and nebulizers in route with some improvement.  On  arrival, she is tachycardic, tachypneic endorsing pain radiating to her back prompting CT imaging to assess for aortic dissection and PE which was fortunately negative.  BNP within normal limits, EKG shows sinus tachycardia.  Exam is consistent with asthma exacerbation, I suspect likely secondary to viral etiology.  Will admit to medicine for continued treatment as she had persistent wheezing, not requiring BiPAP and persistent tachycardia likely secondary to beta agonism.    Diagnosis:    ICD-10-CM    1. Tachycardia R00.0    2. Acute respiratory failure with hypoxia (H) J96.01    3. Moderate persistent asthma with exacerbation J45.41        Disposition:  Admitted to Observation  Wilber Alford MD  [employer]  5:24 PM 12/10/19     I, Kimberley Watson, am serving as a scribe on 12/10/2019 at 1:50 PM to personally document services performed by Wilber Alford MD based on my observations and the provider's statements to me.      Kimberley Watson  12/10/2019    EMERGENCY DEPARTMENT       Wilber Alford MD  12/10/19 1724

## 2019-12-11 ENCOUNTER — APPOINTMENT (OUTPATIENT)
Dept: PHYSICAL THERAPY | Facility: CLINIC | Age: 64
End: 2019-12-11
Attending: STUDENT IN AN ORGANIZED HEALTH CARE EDUCATION/TRAINING PROGRAM
Payer: COMMERCIAL

## 2019-12-11 VITALS
HEIGHT: 60 IN | BODY MASS INDEX: 35.3 KG/M2 | TEMPERATURE: 97.3 F | WEIGHT: 179.8 LBS | RESPIRATION RATE: 16 BRPM | OXYGEN SATURATION: 96 % | DIASTOLIC BLOOD PRESSURE: 62 MMHG | HEART RATE: 112 BPM | SYSTOLIC BLOOD PRESSURE: 151 MMHG

## 2019-12-11 LAB
FACT X ACT/NOR PPP CHRO: 25 % (ref 70–130)
INR PPP: 2.66 (ref 0.86–1.14)

## 2019-12-11 PROCEDURE — 25000125 ZZHC RX 250: Performed by: STUDENT IN AN ORGANIZED HEALTH CARE EDUCATION/TRAINING PROGRAM

## 2019-12-11 PROCEDURE — 97161 PT EVAL LOW COMPLEX 20 MIN: CPT | Mod: GP

## 2019-12-11 PROCEDURE — 25000125 ZZHC RX 250: Performed by: PHYSICIAN ASSISTANT

## 2019-12-11 PROCEDURE — 94640 AIRWAY INHALATION TREATMENT: CPT | Mod: 76

## 2019-12-11 PROCEDURE — 36415 COLL VENOUS BLD VENIPUNCTURE: CPT | Performed by: STUDENT IN AN ORGANIZED HEALTH CARE EDUCATION/TRAINING PROGRAM

## 2019-12-11 PROCEDURE — G0378 HOSPITAL OBSERVATION PER HR: HCPCS

## 2019-12-11 PROCEDURE — 99217 ZZC OBSERVATION CARE DISCHARGE: CPT | Performed by: PHYSICIAN ASSISTANT

## 2019-12-11 PROCEDURE — 85260 CLOT FACTOR X STUART-POWER: CPT | Performed by: STUDENT IN AN ORGANIZED HEALTH CARE EDUCATION/TRAINING PROGRAM

## 2019-12-11 PROCEDURE — 25000132 ZZH RX MED GY IP 250 OP 250 PS 637: Mod: GY | Performed by: STUDENT IN AN ORGANIZED HEALTH CARE EDUCATION/TRAINING PROGRAM

## 2019-12-11 PROCEDURE — 25000132 ZZH RX MED GY IP 250 OP 250 PS 637: Mod: GY | Performed by: PHYSICIAN ASSISTANT

## 2019-12-11 PROCEDURE — 94640 AIRWAY INHALATION TREATMENT: CPT

## 2019-12-11 PROCEDURE — 40000275 ZZH STATISTIC RCP TIME EA 10 MIN

## 2019-12-11 PROCEDURE — 25000131 ZZH RX MED GY IP 250 OP 636 PS 637: Mod: GY | Performed by: STUDENT IN AN ORGANIZED HEALTH CARE EDUCATION/TRAINING PROGRAM

## 2019-12-11 PROCEDURE — 85610 PROTHROMBIN TIME: CPT | Performed by: STUDENT IN AN ORGANIZED HEALTH CARE EDUCATION/TRAINING PROGRAM

## 2019-12-11 RX ORDER — AMOXICILLIN 250 MG
1 CAPSULE ORAL 2 TIMES DAILY PRN
Qty: 60 TABLET | Refills: 0 | Status: SHIPPED | OUTPATIENT
Start: 2019-12-11 | End: 2020-01-10

## 2019-12-11 RX ORDER — WARFARIN SODIUM 2.5 MG/1
2.5 TABLET ORAL
Status: DISCONTINUED | OUTPATIENT
Start: 2019-12-11 | End: 2019-12-11 | Stop reason: HOSPADM

## 2019-12-11 RX ORDER — PREDNISONE 20 MG/1
40 TABLET ORAL DAILY
Qty: 10 TABLET | Refills: 0 | Status: SHIPPED | OUTPATIENT
Start: 2019-12-12 | End: 2019-12-17

## 2019-12-11 RX ORDER — ACETAMINOPHEN 500 MG
1000 TABLET ORAL 2 TIMES DAILY
Status: DISCONTINUED | OUTPATIENT
Start: 2019-12-11 | End: 2019-12-11 | Stop reason: HOSPADM

## 2019-12-11 RX ORDER — BISACODYL 10 MG
10 SUPPOSITORY, RECTAL RECTAL DAILY PRN
Status: DISCONTINUED | OUTPATIENT
Start: 2019-12-11 | End: 2019-12-11 | Stop reason: HOSPADM

## 2019-12-11 RX ORDER — PREDNISONE 5 MG/1
5 TABLET ORAL DAILY
Refills: 0 | COMMUNITY
Start: 2019-12-17 | End: 2024-02-22

## 2019-12-11 RX ORDER — GUAIFENESIN 600 MG/1
1200 TABLET, EXTENDED RELEASE ORAL 2 TIMES DAILY
Status: DISCONTINUED | OUTPATIENT
Start: 2019-12-11 | End: 2019-12-11 | Stop reason: HOSPADM

## 2019-12-11 RX ORDER — AMOXICILLIN 250 MG
1 CAPSULE ORAL 2 TIMES DAILY
Status: DISCONTINUED | OUTPATIENT
Start: 2019-12-11 | End: 2019-12-11 | Stop reason: HOSPADM

## 2019-12-11 RX ORDER — POLYETHYLENE GLYCOL 3350 17 G/17G
17 POWDER, FOR SOLUTION ORAL 3 TIMES DAILY
Status: DISCONTINUED | OUTPATIENT
Start: 2019-12-11 | End: 2019-12-11 | Stop reason: HOSPADM

## 2019-12-11 RX ORDER — LEVALBUTEROL 1.25 MG/.5ML
1.25 SOLUTION, CONCENTRATE RESPIRATORY (INHALATION)
Status: DISCONTINUED | OUTPATIENT
Start: 2019-12-11 | End: 2019-12-11 | Stop reason: HOSPADM

## 2019-12-11 RX ORDER — GUAIFENESIN 600 MG/1
1200 TABLET, EXTENDED RELEASE ORAL 2 TIMES DAILY
Qty: 120 TABLET | Refills: 0 | Status: SHIPPED | OUTPATIENT
Start: 2019-12-11 | End: 2020-01-10

## 2019-12-11 RX ADMIN — LEVALBUTEROL HYDROCHLORIDE 1.25 MG: 1.25 SOLUTION, CONCENTRATE RESPIRATORY (INHALATION) at 11:48

## 2019-12-11 RX ADMIN — FUROSEMIDE 40 MG: 40 TABLET ORAL at 15:56

## 2019-12-11 RX ADMIN — GUAIFENESIN 1200 MG: 600 TABLET, EXTENDED RELEASE ORAL at 10:25

## 2019-12-11 RX ADMIN — FUROSEMIDE 40 MG: 40 TABLET ORAL at 07:40

## 2019-12-11 RX ADMIN — LEVALBUTEROL HYDROCHLORIDE 1.25 MG: 1.25 SOLUTION, CONCENTRATE RESPIRATORY (INHALATION) at 08:12

## 2019-12-11 RX ADMIN — LEVOTHYROXINE SODIUM 88 MCG: 88 TABLET ORAL at 07:40

## 2019-12-11 RX ADMIN — LEVALBUTEROL HYDROCHLORIDE 1.25 MG: 1.25 SOLUTION, CONCENTRATE RESPIRATORY (INHALATION) at 15:59

## 2019-12-11 RX ADMIN — PREDNISONE 40 MG: 20 TABLET ORAL at 07:39

## 2019-12-11 RX ADMIN — SENNOSIDES AND DOCUSATE SODIUM 1 TABLET: 8.6; 5 TABLET ORAL at 10:24

## 2019-12-11 RX ADMIN — IPRATROPIUM BROMIDE 0.5 MG: 0.5 SOLUTION RESPIRATORY (INHALATION) at 15:59

## 2019-12-11 RX ADMIN — LISINOPRIL 2.5 MG: 2.5 TABLET ORAL at 07:40

## 2019-12-11 RX ADMIN — ACETAMINOPHEN 1000 MG: 500 TABLET, FILM COATED ORAL at 07:44

## 2019-12-11 ASSESSMENT — MIFFLIN-ST. JEOR: SCORE: 1287.07

## 2019-12-11 NOTE — PROGRESS NOTES
12/11/19 1037   Quick Adds   Type of Visit Initial PT Evaluation   Living Environment   Lives With spouse;child(ron), dependent   Living Arrangements house   Home Accessibility stairs to enter home   Number of Stairs, Main Entrance 2   Stair Railings, Main Entrance railing on right side (ascending)   Self-Care   Usual Activity Tolerance fair   Current Activity Tolerance fair   Regular Exercise No   Equipment Currently Used at Home walker, rolling   Functional Level Prior   Ambulation 1-->assistive equipment   Transferring 1-->assistive equipment   Fall history within last six months no   Which of the above functional risks had a recent onset or change? none   General Information   Onset of Illness/Injury or Date of Surgery - Date 12/10/19   Referring Physician Austin Caballero MD   Patient/Family Goals Statement Return home   Pertinent History of Current Problem (include personal factors and/or comorbidities that impact the POC) Pt admitted under observation status with SOB, COPD exacerbation and back pain. PMH: HTN, hypothyroid, lupus, nephrotic syndrome, CVA, NHL, history of lobectomy of lung due to cancer.    Precautions/Limitations fall precautions   Weight-Bearing Status - LLE full weight-bearing   Weight-Bearing Status - RLE full weight-bearing   Cognitive Status Examination   Orientation orientation to person, place and time   Level of Consciousness alert   Follows Commands and Answers Questions 100% of the time;able to follow single-step instructions   Personal Safety and Judgment intact   Pain Assessment   Patient Currently in Pain No   Posture    Posture Forward head position;Protracted shoulders;Kyphosis   Range of Motion (ROM)   ROM Quick Adds No deficits were identified   Strength   Strength Comments B hip flex 4/5, knee ext 5/5, DF 5/5   Bed Mobility   Bed Mobility Comments NT, pt in the chair   Transfer Skills   Transfer Comments Sit to stand with FWW and SBA   Gait   Gait Comments Pt amb 100 ft with  "FWW and CGA progressing to SBA with slower pace, stable balance and denied any SOB until right before she was back to her room. O2 sats 95% pre and 94% post on RA. States mild SOB.   Balance   Balance Comments Stable with use of FWW   General Therapy Interventions   Intervention Comments None needed   Clinical Impression   Criteria for Skilled Therapeutic Intervention evaluation only   Clinical Presentation Stable/Uncomplicated   Clinical Presentation Rationale Medically stable   Clinical Decision Making (Complexity) Low complexity   Predicted Duration of Therapy Intervention (days/wks) eval only   Anticipated Discharge Disposition Home   Risk & Benefits of therapy have been explained Yes   Patient, Family & other staff in agreement with plan of care Yes   [facility]-PAC TM \"6 Clicks\"   2016, Trustees of [facility], under license to [employer].  All rights reserved.   6 Clicks Short Forms Basic Mobility Inpatient Short Form   [facility] AM-PAC  \"6 Clicks\" V.2 Basic Mobility Inpatient Short Form   1. Turning from your back to your side while in a flat bed without using bedrails? 3 - A Little   2. Moving from lying on your back to sitting on the side of a flat bed without using bedrails? 3 - A Little   3. Moving to and from a bed to a chair (including a wheelchair)? 3 - A Little   4. Standing up from a chair using your arms (e.g., wheelchair, or bedside chair)? 3 - A Little   5. To walk in hospital room? 3 - A Little   6. Climbing 3-5 steps with a railing? 3 - A Little   Basic Mobility Raw Score (Score out of 24.Lower scores equate to lower levels of function) 18   Total Evaluation Time   Total Evaluation Time (Minutes) 25     "

## 2019-12-11 NOTE — PLAN OF CARE
Care Plan Summary Note: Pt adequate for discharge.  AVS discharge teaching done to pt & family, new, PTA medication, f/u appointments discussed. IS, and Acapella education done and given. Questioned answered, verbalized understanding. Belonging given. Family transporting home.

## 2019-12-11 NOTE — PHARMACY-ANTICOAGULATION SERVICE
Clinical Pharmacy - Warfarin Dosing Consult     Pharmacy has been consulted to manage this patient s warfarin therapy.  Indication: Other - specify in comments  Therapy Goal: Chr Factor 10: 20-40%  Warfarin Prior to Admission: Yes  Warfarin PTA Regimen: 5 mg on Thursday 2.5mg ROW   Dose Comments: CVA    INR   Date Value Ref Range Status   12/10/2019 2.49 (H) 0.86 - 1.14 Final       Recommend warfarin 2.5 mg today.  Pharmacy will monitor Sho Hernandez daily and order warfarin doses to achieve specified goal.      Please contact pharmacy as soon as possible if the warfarin needs to be held for a procedure or if the warfarin goals change.

## 2019-12-11 NOTE — PLAN OF CARE
Observation goals PRIOR TO DISCHARGE     Comments: List all goals to be met before discharge home:   - Dyspnea improved and oxygen saturations greater than 88% on room air or prior home oxygen levels-met. On RA.  - Vitals signs normal or at patient baseline-Tachycardic, other VSS on RA.  - Safe disposition plan has been identified -not met  - Nurse to notify provider when observation goals have been met and patient is ready for discharge.

## 2019-12-11 NOTE — DISCHARGE SUMMARY
Admit Date:     12/10/2019   Discharge Date:   12/11/2019        PRIMARY CARE PHYSICIAN:  Clarissa Poe MS      DISCHARGE DIAGNOSES:   1.  Mild chronic obstructive pulmonary disease exacerbation.   2.  Constipation.   3.  Multiple thoracic compression fractures, incidental finding.   4.  Hypertension.   5.  Hyperlipidemia.   6.  Hypothyroidism.   7.  Systemic lupus erythematosus.   8.  Membranous lupus nephritis syndrome with nephrotic syndrome.   9.  History of left middle cerebral artery (MCA) embolic Cerebrovascular accident (CVA) with lupus antibody anticoagulant positive.   10.  Osteoporosis.   11.  History of non-Hodgkin's lymphoma.   12.  History of adenocarcinoma of the right lung, status post partial lobectomy of the lung.      DISCHARGE MEDICATIONS:       Review of your medicines      START taking      Dose / Directions   guaiFENesin 600 MG 12 hr tablet  Commonly known as:  MUCINEX  Used for:  COPD exacerbation (H)      Dose:  1,200 mg  Take 2 tablets (1,200 mg) by mouth 2 times daily  Quantity:  120 tablet  Refills:  0     senna-docusate 8.6-50 MG tablet  Commonly known as:  SENOKOT-S/PERICOLACE  Used for:  Constipation, unspecified constipation type      Dose:  1 tablet  Take 1 tablet by mouth 2 times daily as needed for constipation  Quantity:  60 tablet  Refills:  0        CONTINUE these medicines which may have CHANGED, or have new prescriptions. If we are uncertain of the size of tablets/capsules you have at home, strength may be listed as something that might have changed.      Dose / Directions   * predniSONE 20 MG tablet  Commonly known as:  DELTASONE  This may have changed:  You were already taking a medication with the same name, and this prescription was added. Make sure you understand how and when to take each.  Used for:  COPD exacerbation (H)      Dose:  40 mg  Start taking on:  December 12, 2019  Take 2 tablets (40 mg) by mouth daily for 5 days  Quantity:  10 tablet  Refills:  0     *  predniSONE 5 MG tablet  Commonly known as:  DELTASONE  This may have changed:  These instructions start on December 17, 2019. If you are unsure what to do until then, ask your doctor or other care provider.      Dose:  5 mg  Start taking on:  December 17, 2019  Take 1 tablet (5 mg) by mouth daily  Refills:  0         * This list has 2 medication(s) that are the same as other medications prescribed for you. Read the directions carefully, and ask your doctor or other care provider to review them with you.            CONTINUE these medicines which have NOT CHANGED      Dose / Directions   acetaminophen 325 MG tablet  Commonly known as:  TYLENOL      Dose:  325-650 mg  Take 325-650 mg by mouth every 6 hours as needed for mild pain  Refills:  0     alendronate 70 MG tablet  Commonly known as:  FOSAMAX      Dose:  70 mg  Take 70 mg by mouth every 7 days  Refills:  0     diphenhydrAMINE 25 MG tablet  Commonly known as:  BENADRYL      Dose:  25 mg  Take 25 mg by mouth nightly as needed for sleep  Refills:  0     folic acid 400 MCG tablet  Commonly known as:  FOLVITE      Dose:  400 mcg  Take 400 mcg by mouth daily  Refills:  0     ipratropium - albuterol 0.5 mg/2.5 mg/3 mL 0.5-2.5 (3) MG/3ML neb solution  Commonly known as:  DUONEB      Dose:  1 vial  Take 1 vial by nebulization 4 times daily  Refills:  0     Lasix 40 MG tablet  Generic drug:  furosemide      Dose:  40 mg  Take 40 mg by mouth 2 times daily.  Refills:  0     levothyroxine 88 MCG tablet  Commonly known as:  SYNTHROID/LEVOTHROID      Dose:  88 mcg  Take 88 mcg by mouth daily  Refills:  0     lisinopril 2.5 MG tablet  Commonly known as:  PRINIVIL/Zestril      Dose:  2.5 mg  Take 2.5 mg by mouth daily  Refills:  0     potassium chloride ER 20 MEQ CR tablet  Commonly known as:  K-DUR/KLOR-CON M      Dose:  20 mEq  Take 20 mEq by mouth 2 times daily  Refills:  0     pyridOXINE 100 MG Tabs  Commonly known as:  VITAMIN B6      Dose:  100 mg  Take 100 mg by mouth  daily  Refills:  0     rosuvastatin 40 MG tablet  Commonly known as:  CRESTOR      Dose:  40 mg  Take 40 mg by mouth At Bedtime  Refills:  0     vitamin B-12 500 MCG tablet  Commonly known as:  CYANOCOBALAMIN      Dose:  500 mcg  Take 500 mcg by mouth daily  Refills:  0     vitamin D3 2000 units (50 mcg) tablet  Commonly known as:  CHOLECALCIFEROL      Dose:  2,000 Units  Take 2,000 Units by mouth daily  Refills:  0     warfarin ANTICOAGULANT 5 MG tablet  Commonly known as:  COUMADIN      Take by mouth daily -  For history of stroke   Goal CFX 20-40%  5 mg on Thursday  2.5 mg on all other days  Refills:  0           Where to get your medicines      These medications were sent to [facility] [place] - [place], [place] - [address]  [address], [place] [address]    Phone:  [phone]     guaiFENesin 600 MG 12 hr tablet    predniSONE 20 MG tablet    senna-docusate 8.6-50 MG tablet           ALLERGIES:   Allergies   Allergen Reactions     Levaquin [Levofloxacin]      Penicillins Unknown       DISPOSITION:  Home.      FOLLOWUP WITH RECOMMENDATIONS:  The patient has been scheduled to follow up with primary care provider, Clarissa Poe MD on 12/18/2019 at in the afternoon.      ACTIVITY:  As tolerated.      DIET:  Regular diet.      DISCHARGE INSTRUCTIONS ADDITIONAL:  Continue use of incentive spirometer and flutter valve every hour while awake to assist with airflow through the lungs.      CONSULTS:  Orthopedic surgery.      LABORATORY IMAGING AND PROCEDURES:   1.  Routine laboratory studies that included initial troponin, INR x 2, ProBNP, basic metabolic panel, CBC with platelet differential, plan of care creatinine, factor X chromogenic factor.   2.  CT aortic survey with contrast.   3.  EKG.      PENDING RESULTS:  None.      PHYSICAL EXAMINATION ON DAY OF DISCHARGE:  Please review progress note as written earlier today.      BRIEF HISTORY OF PRESENT ILLNESS:  Sho Hernandez is a  64-year-old female with a past medical history significant for hypertension, hyperlipidemia, SLE, COPD, obesity, hypothyroidism, osteoporosis, history of left MCA stroke with residual aphasia, known lupus anticoagulant positive antibodies, history of non-Hodgkin's lymphoma and a history of right lung adenocarcinoma, who was registered to observation due to mild COPD exacerbation and incidental thoracic compression fractures.      HOSPITAL COURSE:   1.  Mild chronic obstructive pulmonary disease exacerbation:  The patient had presented with a 2-3 day history of progressive dyspnea on exertion.  A CT aortic survey was performed with contrast, which was negative for aortic dissection, and no consolidation was seen.  Possible reactive airway disease versus bronchitis versus asthma exacerbation was noted.  The patient did not require supplemental oxygen and did not appear to be fluid overloaded as proBNP was negative.  In the Emergency Department, the patient received IV magnesium and IV Solu-Medrol.  While hospitalized overnight, patient received scheduled Xopenex nebulizing therapies and was started on a prednisone burst 40 mg.  Encouraged pulmonary hygiene with incentive spirometer and flutter valve and was started on Mucinex 1200 mg 2 times daily.  At time of discharge, the patient will continue with a 5-day course of prednisone 40 mg daily and then resume daily prednisone 5 mg following completion of the burst.  The patient has been given a prescription for Mucinex 1200 mg 2 times daily and encouraged to continue utilization of incentive spirometer and flutter valve.  The patient has a scheduled followup with primary care provider next week.   2.  Constipation:  Upon brief discussion with the patient on day of discharge, she indicated that she had not had a bowel movement in several days.  Senokot was ordered 2 times daily, as was MiraLax.  The patient received Senokot and had a medium-sized bowel movement and  declined MiraLax or further interventions.  At time of discharge, a prescription has been written for continued Senokot as needed.   3.  Incidental multiple thoracic compression fractures:  Patient was noted to have multiple compression deformities in the lower thoracic spine and endplate deformities in the lumbar spine.  The patient has known history of thoracic fractures in 2014.  Orthopedic Surgery was consulted.  They reviewed the imaging studies and felt that these were old and likely incidental findings.  As patient had no active back pain and worked well with physical therapy, they did not want to proceed with any further imaging studies and recommended followup in the outpatient setting if pain increases.  The patient did work with physical therapy and was at her baseline with no pain.  She did utilize scheduled Tylenol while here and at home.  We will continue with as-needed Tylenol.   4.  Hypertension:  The patient's prior to admit lisinopril and Lasix were continued during this stay and will be resumed at time of discharge.   5.  Hyperlipidemia:  Patient's prior to admit Crestor was held during this stay due to observation status and will be resumed at time of discharge.   6.  Hypothyroidism:  Patient's prior to admit Synthroid was continued during this stay and will be resumed at time of discharge.   7.  Systemic lupus erythematosus:  Patient is normally on prednisone 5 mg daily due to mild chronic obstructive pulmonary disease exacerbation.  The patient was started on a steroid burst 40 mg daily, and the patient will be sent home with a 5-day course 40 mg daily and then will return to normal dosing of 5 mg daily.   8.  Membranous lupus nephritis syndrome with nephrotic syndrome:  The patient's creatinine remained stable throughout this stay.  She is on chronic prednisone and is currently discharging on a steroid burst and then resumption of home dose.   9.  History of left middle cerebral artery (MCA)  embolic cerebrovascular accident (CVA) and known lupus anticoagulant positive antibody:  Patient is maintained on Coumadin.  Pharmacy assists with dosing.  Chromogenic factor X was noted to be 25, showing a therapeutic range as INR was also therapeutic at 2.66.  The patient will continue Coumadin therapy at time of discharge.   10.  Osteoporosis:  Patient's prior to admit Fosamax was held and will be resumed at time of discharge.   11.  History of non-Hodgkin lymphoma and adenocarcinoma of the right lung, status post partial lobectomy of the lung:  The patient follows with Dr. Mcmahon as outpatient.  No interventions were required during this stay.      CODE STATUS:  Full code.      The patient was discussed with Dr. Rey who agrees with discharge at this time.  Dr. Rey will evaluate the patient independently.      TOTAL DISCHARGE TIME:  Less than 30 minutes.         ANCA REY MD       As dictated by THADDEUS VALERA PA-C            D: 2019   T: 2019   MT:       Name:     SONJA TRINH   MRN:      [id number]        Account:        [id number]   :      1955           Admit Date:     12/10/2019                                  Discharge Date:       Document: [id number]       cc: Clarissa Poe MD

## 2019-12-11 NOTE — PLAN OF CARE
PRIMARY DIAGNOSIS: ASTHMA  OUTPATIENT/OBSERVATION GOALS TO BE MET BEFORE DISCHARGE:  1. Vital signs stable: Yes    2. Improvement of peak flow to greater than 70% sustained off nebulizer for 4 hours: Yes    3. Dyspnea improved and O2 sats >88% at RA or at prior home O2 therapy level: Yes      SpO2: 94 %, O2 Device: None (Room air)    4. Short term supplemental O2 needed for use with activity at home: No    5. Tolerating adequate PO diet and medications: Yes    6. Return to near baseline physical activity: Yes    Discharge Planner Nurse   Safe discharge environment identified: No  Barriers to discharge: Yes       Entered by: Radha Owens 12/11/2019 9:45 AM     Please review provider order for any additional goals.   Nurse to notify provider when observation goals have been met and patient is ready for discharge.

## 2019-12-11 NOTE — PROGRESS NOTES
A/O, baseline moderate aphasia from CVA. Tachycardic at times, other VSS on RA. Denies SOB and pain. Infrequent cough, LS diminished. Regular diet. Up w/ Ax1 and walker to bathroom. Voiding. CMS intact. Orthopedic surgery and PT consults today.

## 2019-12-11 NOTE — CONSULTS
"Care Transition Initial Assessment - RN  Provider requested CC-RN schedule PCP followup in 1 week    DATA   Active Problems:    Anticoagulation monitoring, special range    CKD (chronic kidney disease) stage 3, GFR 30-59 ml/min (H)    Hyperlipidemia    Hypertension    Hypothyroidism    Lupus anticoagulant positive    Membranous lupus nephritis syndrome (H)    Nephrotic syndrome    Non-Hodgkin lymphoma (H)    Rheumatoid arthritis involving multiple sites (H)    Systemic lupus erythematosus (H)       Cognitive Status: awake, alert and oriented.  Contact information and PCP information verified: Yes, pt is an active pt at [facility] (PCP is Clarissa Poe)  Lives With: spouse, child(ron), dependent   Living Arrangements: house    Insurance concerns: No Insurance issues identified   + active MEDICARE and [place] of [place] insurance    ASSESSMENT  Patient currently receives the following services:    + per PT note, pt is \"living with her spouse and one of her children in a house, stays on the main level, uses a FWW and is independent with mobility in the home and uses a wc for long distances outside the home\"        Identified issues/concerns regarding health management:   + hospitalist requesting CC RN schedule PCP followup.    + this RN contacted PCP, who states only one appointment avaialable with pt's PCP, appointment reserved and entered on AVS.     PLAN  Financial costs for the patient include : copays per insurance guidelines .  Patient given options and choices for discharge NO .  Patient anticipates discharging to Home no services, PCP followup 1 week .   Patient anticipates needs for home equipment: No  Transportation/person available to transport on day of discharge  is : spouse, pt will arrange  Plan/Disposition: Home   Appointments:   Added to AVS,      A Hospital follow-up appointment was scheduled for you in 1 week   with your Primary Care Provider, Clarissa Poe   **Wednesday December 18, 2019 at " 4:40 PM**   at [facility] ([phone])   [address] [facility] [address]                 Care  (CTS) will continue to follow as needed.    Alix Livingston RN, BSN, PHN  [facility] [place] [facility]  [facility]   Mobile: [phone]

## 2019-12-11 NOTE — PLAN OF CARE
PRIMARY DIAGNOSIS: ASTHMA  OUTPATIENT/OBSERVATION GOALS TO BE MET BEFORE DISCHARGE:  1. Vital signs stable: Yes    2. Improvement of peak flow to greater than 70% sustained off nebulizer for 4 hours: Yes    3. Dyspnea improved and O2 sats >88% at RA or at prior home O2 therapy level: Yes      SpO2: 94 %, O2 Device: None (Room air)    4. Short term supplemental O2 needed for use with activity at home: No    5. Tolerating adequate PO diet and medications: Yes    6. Return to near baseline physical activity: Yes    Discharge Planner Nurse   Safe discharge environment identified: No  Barriers to discharge: Yes       Entered by: Radha Owens 12/11/2019 2:38 PM     Please review provider order for any additional goals.   Nurse to notify provider when observation goals have been met and patient is ready for discharge.

## 2019-12-11 NOTE — PROGRESS NOTES
MD Notification    Notified Person: MD    Notified Person Name: Dr. Hugo Zuñiga     Notification Date/Time: 12/10/19 2220     Notification Interaction: Paged    Purpose of Notification: Pt requesting her PTA 25mg PO Benadryl @ .     Orders Received: Medication ordered.     Comments: Will continue to monitor.

## 2019-12-11 NOTE — PROGRESS NOTES
[facility]    Hospitalist Progress Note    Assessment & Plan   Sho Hernandez is a 64 year old female who was admitted on 12/10/2019.     Past medical history significant for HTN, HLD, L MCA stroke with residual aphasia, lupus anticoagulant positive, SLE, COPD, Obesity, hypothyroidism, osteoporosis and history of Non-Hodgkins lymphoma and right lung adenocarcinoma who was registered to observation due to mild COPD exacerbation and thoracic compression fractures.      Mild COPD Exacerbation:  Presented with 2-3 day hx of progressive dyspnea on exertion.  CT aortic survey with contrast was negative for aortic dissection and no consolidation was seen. Possible reactive airway disease vs bronchitis vs asthma exacerbation. Hemodynamics stable, saturating well on RA. Not significantly fluid overloaded. Received IV Solumedrol and IV magnesium in the ED.    --Xopenex nebulizer and Ipratropium Neb every 4 hours while awake.    --Prednisone burst, 40 mg daily.  --Supplemental O2 as needed.  --Follow vitals/temp.  --IS/Flutter Valve.    --Mucinex 1200 mg BID.       Constipation:  Patient complained of being constipated and has not had a bowel movement in several days.    --Miralx TID.    --Senokot BID.    --PRN suppository and PRN enema available.      Multiple thoracic compression fractures:  Known history of thoracic compression fracture of CT chest does show multiple compression deformities in the lower thoracic spine and endplate deformities in the lumbar spine  --Orthopedic surgery consult requested.    --Analgesic management:   --Schedule APAP 1000 mg BID, PRN PO Oxycodone   --Fall precautions.  --PT.       Hypertension:  PTA regiment includes Lisinopril 2.5 mg daily and lasix 40 mg BID.    --Continue PTA Lisinopril and Lasix with hold parameters.    Hyperlipidemia:  PTA Crestor 40 mg at bedtime as otupatient  --Hold while in Obs and resume statin at discharge.    Hypothyroidism:  On synthroid 175 mcg  daily.  --Continue PTA synthroid replacement.    Systemic lupus erythematosus:  Takes Prednisone 5 mg daily.  --Continue prednisone-->but started a burst of 40 mg daily.      Membranous lupus nephritis syndrome  Nephrotic Syndrome:  Creatinine on admission 1.0 which is at baseline, patient is on prednisone for SLE. Followed with nephrology and rheum in the past   --Avoid NSAIDs/nephrotoxins.  --Continue prednisone as above.      CVA with H/O of L MCA embolic stroke   Lupus anticoagulant positive:  PTA patient is on coumadin. Aphasia and comprehension deficits at baseline.   --Continue Coumadin, pharmacy to dose.  --Follow Chromogenic factor X (pending)    Osteoporosis:  --Hold PTA Fosamax and resume at discharge.      H/O: Non-Hodgkin lymphoma  H/O: Adenocarcinoma of right lung s/p partial lobectomy of lung   Follows with Dr. Mcmahon as outpatient.   --Noted, NTD.    Diet: Regular Diet Adult     DVT Prophylaxis: Warfarin   Gregg Catheter: not present  Code Status: Full Code     Disposition Plan    Expected discharge: Today versus tomorrow, recommended to awaiting PT assessment once shortness of breath has improved, PT assessment completed, Ortho consult completed.   Entered: Jose Person PA-C 12/11/2019, 6:42 AM        The patient has been discussed with Dr. Delgadillo, who agrees with the assessment and plan at this time.  Dr. Delgadillo will evaluate the patient independently.      Dyllan Person PA-C   [phone]    Interval History   Patient was asleep in a wheelchair upon arrival.  She awakened easily.  She denied fever, chills, chest pain or abdominal pain.  She continues to feel short of breath and has a wheeze.  She mentioned she has not had a bowel movement in several days but is passing flatus.      -Data reviewed today: I reviewed all new labs and imaging results over the last 24 hours. I personally reviewed no images or EKG's today.    Physical Exam   Temp: 97.9  F (36.6  C) Temp src: Oral BP: 128/73  Pulse: 112 Heart Rate: 99 Resp: 18 SpO2: 96 % O2 Device: None (Room air)    Vitals:    12/10/19 1046   Weight: 81.6 kg (180 lb)     Vital Signs with Ranges  Temp:  [97.1  F (36.2  C)-98.7  F (37.1  C)] 97.9  F (36.6  C)  Pulse:  [112] 112  Heart Rate:  [] 99  Resp:  [9-31] 18  BP: (128-154)/(65-82) 128/73  SpO2:  [94 %-100 %] 96 %  I/O last 3 completed shifts:  In: 870 [P.O.:120; I.V.:50; IV Piggyback:700]  Out: -       Constitutional: Awake, alert, cooperative, no apparent distress.    ENT: Normocephalic, without obvious abnormality, atraumatic, oral pharynx with moist mucus membranes, tonsils without erythema or exudates.  Neck: Supple, symmetrical, trachea midline, no adenopathy.  Pulmonary: No increased work of breathing.  Fair air exchange though diminished at the bases and expiratory wheeze appreciated.    Cardiovascular: Regular rate and rhythm, normal S1 and S2, no S3 or S4, and no murmur noted.  GI: Normal bowel sounds, soft, non-distended, non-tender.  Skin/Integumen: Bilateral lower extremity venous stasis change noted, otherwise appears clear.    Neuro: Residual deficits from previous CVA with slight aphasia otherwise CN II-XII grossly intact.  Psych:  Alert and oriented x 3. Normal affect.  Extremities: Trace lower extremity edema noted, and calves are non-TTP bilaterally.       Medications     Warfarin Therapy Reminder         furosemide  40 mg Oral BID     levalbuterol  1.25 mg Nebulization 4x Daily     levothyroxine  88 mcg Oral Daily     lisinopril  2.5 mg Oral Daily     predniSONE  40 mg Oral Daily     sodium chloride (PF)  3 mL Intracatheter Q8H       Data   Recent Labs   Lab 12/11/19  0642 12/10/19  1113   WBC  --  10.9   HGB  --  12.9   MCV  --  85   PLT  --  253   INR 2.66* 2.49*   NA  --  134   POTASSIUM  --  4.1   CHLORIDE  --  101   CO2  --  29   BUN  --  19   CR  --  0.94   ANIONGAP  --  4   PATRICIA  --  9.8   GLC  --  103*   TROPI  --  0.037       Recent Results (from the past 24  hour(s))   CT Aortic Survey w Contrast    Narrative    CT AORTIC SURVEY WITH CONTRAST December 10, 2019 12:21 PM     HISTORY: Chest pain radiating to back. Shortness of breath,  tachycardia.    TECHNIQUE: 80mL Isovue-370. CT images of the chest, abdomen, and  pelvis after nonionic intravenous contrast. Radiation dose for this  scan was reduced using automated exposure control, adjustment of the  mA and/or kV according to patient size, or iterative reconstruction  technique.    COMPARISON: None.    FINDINGS:     Aorta: Thoracic aortic caliber within normal limits. No aortic  dissection. Conventional three-vessel anatomy of the aortic arch. Mild  thoracic aortic atherosclerosis. There is moderate abdominal aortic  atherosclerosis without evidence of dissection. Visceral branches are  mildly stenotic at their origin but otherwise patent. There is severe  atherosclerosis at the aortic bifurcation, likely causing high-grade  stenosis.    Chest: Previous wedge resection in the upper right lung. Minor  atelectasis left lung base. There are irregular ground-glass opacities  in the left upper lobe that are nonspecific. Mild emphysematous  changes. No pleural or pericardial effusion. Moderate coronary  atherosclerosis.    Abdomen/Pelvis: There are two hemangiomas in the inferior aspect of  the liver. Calcified gallstones are present. The spleen, pancreas,  adrenal glands, and right kidney are unremarkable. There is a  nonobstructing 2 mm calculus in left kidney. Small hiatal hernia is  present. No abdominal or retroperitoneal lymphadenopathy. No bowel  obstruction, free air, or ascites. There is diastases recti. No pelvic  adenopathy, free fluid, or mass.    Healed right-sided pubic rami fractures are noted. There are multiple  compression deformities in the lower thoracic spine with increased  thoracic kyphosis. There is also superior endplate deformity of L3 and  inferior endplate deformity at L1.      Impression     IMPRESSION:  1. No evidence of aortic dissection.  2. Moderate coronary atherosclerosis.  3. Probable high-grade stenosis at the aortic bifurcation.  4. Multiple compression deformities in the lower thoracic spine and  endplate deformities in the lumbar spine. If there is concern for  acute compression fracture, consider MRI.    GENE ARCOS MD

## 2019-12-11 NOTE — PROGRESS NOTES
Consult received    Chart and images reviewed.  Discussed case with Dyllan Person PA-C and nurse caring for patient.      No recent trauma  Patient not complaining of significant back pain  Compression fractures likely incidental findings on CT scan.      Plan:  Would not recommend pursuing MRI/further imaging unless back pain worsening/unable to manage  Able to WBAT  Progress in PT/OT as able  Follow-up with Dr Nava in out-patient setting if continued pain

## 2019-12-11 NOTE — PLAN OF CARE
PT:  Discharge Planner PT   Patient plan for discharge: Return home  Current status: Orders received, eval completed. Pt admitted under observation status with SOB, back pain and found to have a COPD exacerbation. Prior to admit pt was living with her spouse and one of her children in a house, stays on the main level, uses a FWW and is independent with mobility in the home and uses a wc for long distances outside the home. Currently requires SBA for sit to/from stand with FWW, CGA progressing to SBA for gait of 100 ft with FWW with stable balance and denied SOB until the very end of gait, sats 95% pre and 94% post on RA.  Barriers to return to prior living situation: None  Recommendations for discharge: Return home  Rationale for recommendations: Pt is at or close to baseline and feels much better. Pt has no skilled PT needs at this time.       Entered by: Yuni Barrientos 12/11/2019 11:06 AM

## 2019-12-11 NOTE — PLAN OF CARE
Observation goals PRIOR TO DISCHARGE     Comments: List all goals to be met before discharge home:   - Dyspnea improved and oxygen saturations greater than 88% on room air or prior home oxygen levels-Met.  - Vitals signs normal or at patient baseline-Met VSS on RA.  - Safe disposition plan has been identified -not met  - Nurse to notify provider when observation goals have been met and patient is ready for discharge

## 2019-12-11 NOTE — PLAN OF CARE
A&Ox4, VSS on RA with ex of tachycardia. Mild baseline aphasia from PTA CVA; word finding difficulty and slow to respond. Orientation questions assessed with patient writing answers down. Dyspnea on exertion. Expiratory wheezing in upper lobes. Acapella and IS instruction given, nebulizers q4.  Denies pain. Reg diet, voiding adequately in bathroom. 1 medium sized bowel movement; pt refused miralax. Ax1 with walker. IV SL. PT seen, recommending return home.  Orthopedic surgery consult for compression deformities in thoracic spine.

## 2020-12-06 DIAGNOSIS — Z11.59 ENCOUNTER FOR SCREENING FOR OTHER VIRAL DISEASES: Primary | ICD-10-CM

## 2020-12-22 ENCOUNTER — TRANSFERRED RECORDS (OUTPATIENT)
Dept: HEALTH INFORMATION MANAGEMENT | Facility: CLINIC | Age: 65
End: 2020-12-22

## 2020-12-24 DIAGNOSIS — Z11.59 ENCOUNTER FOR SCREENING FOR OTHER VIRAL DISEASES: ICD-10-CM

## 2020-12-24 PROCEDURE — U0003 INFECTIOUS AGENT DETECTION BY NUCLEIC ACID (DNA OR RNA); SEVERE ACUTE RESPIRATORY SYNDROME CORONAVIRUS 2 (SARS-COV-2) (CORONAVIRUS DISEASE [COVID-19]), AMPLIFIED PROBE TECHNIQUE, MAKING USE OF HIGH THROUGHPUT TECHNOLOGIES AS DESCRIBED BY CMS-2020-01-R: HCPCS | Performed by: COLON & RECTAL SURGERY

## 2020-12-25 LAB
LABORATORY COMMENT REPORT: NORMAL
SARS-COV-2 RNA SPEC QL NAA+PROBE: NEGATIVE
SARS-COV-2 RNA SPEC QL NAA+PROBE: NORMAL
SPECIMEN SOURCE: NORMAL
SPECIMEN SOURCE: NORMAL

## 2020-12-28 ENCOUNTER — ANESTHESIA EVENT (OUTPATIENT)
Dept: GASTROENTEROLOGY | Facility: CLINIC | Age: 65
End: 2020-12-28
Payer: COMMERCIAL

## 2020-12-28 ENCOUNTER — ANESTHESIA (OUTPATIENT)
Dept: GASTROENTEROLOGY | Facility: CLINIC | Age: 65
End: 2020-12-28
Payer: COMMERCIAL

## 2020-12-28 ENCOUNTER — HOSPITAL ENCOUNTER (OUTPATIENT)
Facility: CLINIC | Age: 65
Discharge: HOME | End: 2020-12-28
Attending: COLON & RECTAL SURGERY | Admitting: COLON & RECTAL SURGERY
Payer: COMMERCIAL

## 2020-12-28 VITALS
HEART RATE: 101 BPM | HEIGHT: 61 IN | SYSTOLIC BLOOD PRESSURE: 114 MMHG | RESPIRATION RATE: 16 BRPM | BODY MASS INDEX: 35.87 KG/M2 | OXYGEN SATURATION: 98 % | DIASTOLIC BLOOD PRESSURE: 60 MMHG | TEMPERATURE: 98.2 F | WEIGHT: 190 LBS

## 2020-12-28 LAB — COLONOSCOPY: NORMAL

## 2020-12-28 PROCEDURE — 370N000001 HC ANESTHESIA TECHNICAL FEE, 1ST 30 MIN: Performed by: COLON & RECTAL SURGERY

## 2020-12-28 PROCEDURE — 45380 COLONOSCOPY AND BIOPSY: CPT | Mod: PT,XU | Performed by: COLON & RECTAL SURGERY

## 2020-12-28 PROCEDURE — 88305 TISSUE EXAM BY PATHOLOGIST: CPT | Mod: TC | Performed by: COLON & RECTAL SURGERY

## 2020-12-28 PROCEDURE — 272N000105 HC DEVICE CLIP QUICK: Performed by: COLON & RECTAL SURGERY

## 2020-12-28 PROCEDURE — 250N000009 HC RX 250: Performed by: NURSE ANESTHETIST, CERTIFIED REGISTERED

## 2020-12-28 PROCEDURE — 88305 TISSUE EXAM BY PATHOLOGIST: CPT | Mod: 26 | Performed by: PATHOLOGY

## 2020-12-28 PROCEDURE — 45385 COLONOSCOPY W/LESION REMOVAL: CPT | Mod: PT | Performed by: COLON & RECTAL SURGERY

## 2020-12-28 PROCEDURE — 258N000003 HC RX IP 258 OP 636: Performed by: NURSE ANESTHETIST, CERTIFIED REGISTERED

## 2020-12-28 PROCEDURE — 250N000011 HC RX IP 250 OP 636: Performed by: NURSE ANESTHETIST, CERTIFIED REGISTERED

## 2020-12-28 PROCEDURE — 999N000010 HC STATISTIC ANES STAT CODE-CRNA PER MINUTE: Performed by: COLON & RECTAL SURGERY

## 2020-12-28 PROCEDURE — 370N000002 HC ANESTHESIA TECHNICAL FEE, EACH ADDTL 15 MIN: Performed by: COLON & RECTAL SURGERY

## 2020-12-28 RX ORDER — ONDANSETRON 2 MG/ML
4 INJECTION INTRAMUSCULAR; INTRAVENOUS EVERY 6 HOURS PRN
Status: DISCONTINUED | OUTPATIENT
Start: 2020-12-28 | End: 2020-12-28 | Stop reason: HOSPADM

## 2020-12-28 RX ORDER — FLUMAZENIL 0.1 MG/ML
0.2 INJECTION, SOLUTION INTRAVENOUS
Status: DISCONTINUED | OUTPATIENT
Start: 2020-12-28 | End: 2020-12-28 | Stop reason: HOSPADM

## 2020-12-28 RX ORDER — NALOXONE HYDROCHLORIDE 0.4 MG/ML
0.4 INJECTION, SOLUTION INTRAMUSCULAR; INTRAVENOUS; SUBCUTANEOUS
Status: DISCONTINUED | OUTPATIENT
Start: 2020-12-28 | End: 2020-12-28 | Stop reason: HOSPADM

## 2020-12-28 RX ORDER — NALOXONE HYDROCHLORIDE 0.4 MG/ML
0.2 INJECTION, SOLUTION INTRAMUSCULAR; INTRAVENOUS; SUBCUTANEOUS
Status: DISCONTINUED | OUTPATIENT
Start: 2020-12-28 | End: 2020-12-28 | Stop reason: HOSPADM

## 2020-12-28 RX ORDER — LIDOCAINE 40 MG/G
CREAM TOPICAL
Status: DISCONTINUED | OUTPATIENT
Start: 2020-12-28 | End: 2020-12-28 | Stop reason: HOSPADM

## 2020-12-28 RX ORDER — PROPOFOL 10 MG/ML
INJECTION, EMULSION INTRAVENOUS PRN
Status: DISCONTINUED | OUTPATIENT
Start: 2020-12-28 | End: 2020-12-28

## 2020-12-28 RX ORDER — DIPHENHYDRAMINE HCL 25 MG
25 CAPSULE ORAL EVERY 4 HOURS PRN
Status: DISCONTINUED | OUTPATIENT
Start: 2020-12-28 | End: 2020-12-28 | Stop reason: HOSPADM

## 2020-12-28 RX ORDER — SODIUM CHLORIDE, SODIUM LACTATE, POTASSIUM CHLORIDE, CALCIUM CHLORIDE 600; 310; 30; 20 MG/100ML; MG/100ML; MG/100ML; MG/100ML
INJECTION, SOLUTION INTRAVENOUS CONTINUOUS PRN
Status: DISCONTINUED | OUTPATIENT
Start: 2020-12-28 | End: 2020-12-28

## 2020-12-28 RX ORDER — ONDANSETRON 2 MG/ML
INJECTION INTRAMUSCULAR; INTRAVENOUS PRN
Status: DISCONTINUED | OUTPATIENT
Start: 2020-12-28 | End: 2020-12-28

## 2020-12-28 RX ORDER — LIDOCAINE HYDROCHLORIDE 20 MG/ML
INJECTION, SOLUTION INFILTRATION; PERINEURAL PRN
Status: DISCONTINUED | OUTPATIENT
Start: 2020-12-28 | End: 2020-12-28

## 2020-12-28 RX ORDER — ONDANSETRON 4 MG/1
4 TABLET, ORALLY DISINTEGRATING ORAL EVERY 6 HOURS PRN
Status: DISCONTINUED | OUTPATIENT
Start: 2020-12-28 | End: 2020-12-28 | Stop reason: HOSPADM

## 2020-12-28 RX ORDER — PROCHLORPERAZINE MALEATE 5 MG
5 TABLET ORAL EVERY 6 HOURS PRN
Status: DISCONTINUED | OUTPATIENT
Start: 2020-12-28 | End: 2020-12-28 | Stop reason: HOSPADM

## 2020-12-28 RX ORDER — DIPHENHYDRAMINE HYDROCHLORIDE 50 MG/ML
25 INJECTION INTRAMUSCULAR; INTRAVENOUS EVERY 4 HOURS PRN
Status: DISCONTINUED | OUTPATIENT
Start: 2020-12-28 | End: 2020-12-28 | Stop reason: HOSPADM

## 2020-12-28 RX ORDER — ONDANSETRON 2 MG/ML
4 INJECTION INTRAMUSCULAR; INTRAVENOUS
Status: DISCONTINUED | OUTPATIENT
Start: 2020-12-28 | End: 2020-12-28 | Stop reason: HOSPADM

## 2020-12-28 RX ORDER — EPHEDRINE SULFATE 50 MG/ML
INJECTION, SOLUTION INTRAMUSCULAR; INTRAVENOUS; SUBCUTANEOUS PRN
Status: DISCONTINUED | OUTPATIENT
Start: 2020-12-28 | End: 2020-12-28

## 2020-12-28 RX ORDER — PROPOFOL 10 MG/ML
INJECTION, EMULSION INTRAVENOUS CONTINUOUS PRN
Status: DISCONTINUED | OUTPATIENT
Start: 2020-12-28 | End: 2020-12-28

## 2020-12-28 RX ADMIN — PHENYLEPHRINE HYDROCHLORIDE 100 MCG: 10 INJECTION INTRAVENOUS at 07:45

## 2020-12-28 RX ADMIN — PHENYLEPHRINE HYDROCHLORIDE 150 MCG: 10 INJECTION INTRAVENOUS at 07:37

## 2020-12-28 RX ADMIN — Medication 5 MG: at 07:52

## 2020-12-28 RX ADMIN — PHENYLEPHRINE HYDROCHLORIDE 150 MCG: 10 INJECTION INTRAVENOUS at 08:04

## 2020-12-28 RX ADMIN — PHENYLEPHRINE HYDROCHLORIDE 100 MCG: 10 INJECTION INTRAVENOUS at 07:34

## 2020-12-28 RX ADMIN — PHENYLEPHRINE HYDROCHLORIDE 100 MCG: 10 INJECTION INTRAVENOUS at 07:41

## 2020-12-28 RX ADMIN — PHENYLEPHRINE HYDROCHLORIDE 150 MCG: 10 INJECTION INTRAVENOUS at 08:16

## 2020-12-28 RX ADMIN — Medication 5 MG: at 08:16

## 2020-12-28 RX ADMIN — DEXMEDETOMIDINE HYDROCHLORIDE 8 MCG: 100 INJECTION, SOLUTION INTRAVENOUS at 07:26

## 2020-12-28 RX ADMIN — ONDANSETRON 4 MG: 2 INJECTION INTRAMUSCULAR; INTRAVENOUS at 07:52

## 2020-12-28 RX ADMIN — PHENYLEPHRINE HYDROCHLORIDE 100 MCG: 10 INJECTION INTRAVENOUS at 07:49

## 2020-12-28 RX ADMIN — Medication 5 MG: at 08:10

## 2020-12-28 RX ADMIN — DEXMEDETOMIDINE HYDROCHLORIDE 4 MCG: 100 INJECTION, SOLUTION INTRAVENOUS at 07:30

## 2020-12-28 RX ADMIN — DEXMEDETOMIDINE HYDROCHLORIDE 4 MCG: 100 INJECTION, SOLUTION INTRAVENOUS at 07:56

## 2020-12-28 RX ADMIN — PHENYLEPHRINE HYDROCHLORIDE 100 MCG: 10 INJECTION INTRAVENOUS at 07:43

## 2020-12-28 RX ADMIN — PHENYLEPHRINE HYDROCHLORIDE 100 MCG: 10 INJECTION INTRAVENOUS at 07:59

## 2020-12-28 RX ADMIN — PHENYLEPHRINE HYDROCHLORIDE 100 MCG: 10 INJECTION INTRAVENOUS at 07:52

## 2020-12-28 RX ADMIN — Medication 5 MG: at 07:45

## 2020-12-28 RX ADMIN — PHENYLEPHRINE HYDROCHLORIDE 200 MCG: 10 INJECTION INTRAVENOUS at 07:39

## 2020-12-28 RX ADMIN — PROPOFOL 30 MG: 10 INJECTION, EMULSION INTRAVENOUS at 07:29

## 2020-12-28 RX ADMIN — PHENYLEPHRINE HYDROCHLORIDE 100 MCG: 10 INJECTION INTRAVENOUS at 08:02

## 2020-12-28 RX ADMIN — DEXMEDETOMIDINE HYDROCHLORIDE 4 MCG: 100 INJECTION, SOLUTION INTRAVENOUS at 08:11

## 2020-12-28 RX ADMIN — SODIUM CHLORIDE, POTASSIUM CHLORIDE, SODIUM LACTATE AND CALCIUM CHLORIDE: 600; 310; 30; 20 INJECTION, SOLUTION INTRAVENOUS at 07:25

## 2020-12-28 RX ADMIN — PROPOFOL 150 MCG/KG/MIN: 10 INJECTION, EMULSION INTRAVENOUS at 07:29

## 2020-12-28 RX ADMIN — PHENYLEPHRINE HYDROCHLORIDE 100 MCG: 10 INJECTION INTRAVENOUS at 08:10

## 2020-12-28 RX ADMIN — PHENYLEPHRINE HYDROCHLORIDE 100 MCG: 10 INJECTION INTRAVENOUS at 08:07

## 2020-12-28 RX ADMIN — LIDOCAINE HYDROCHLORIDE 40 MG: 20 INJECTION, SOLUTION INFILTRATION; PERINEURAL at 07:29

## 2020-12-28 RX ADMIN — PHENYLEPHRINE HYDROCHLORIDE 100 MCG: 10 INJECTION INTRAVENOUS at 07:55

## 2020-12-28 RX ADMIN — PROPOFOL 25 MG: 10 INJECTION, EMULSION INTRAVENOUS at 08:13

## 2020-12-28 ASSESSMENT — COPD QUESTIONNAIRES: COPD: 1

## 2020-12-28 ASSESSMENT — MIFFLIN-ST. JEOR: SCORE: 1344.21

## 2020-12-28 NOTE — ANESTHESIA PREPROCEDURE EVALUATION
Anesthesia Pre-Procedure Evaluation    Patient: Sho Hernandez   MRN: [id number] : 1955          Preoperative Diagnosis: Special screening for malignant neoplasms, colon [Z12.11]    Procedure(s):  COLONOSCOPY    Past Medical History:   Diagnosis Date     Cancer (H) 2014    lung     Past Surgical History:   Procedure Laterality Date     THORACIC SURGERY  2014    2 sections of right lung     Minimally active -- mostly wheelchair bound since prior stroke. Had negative stress test  (atypical chest pain). No recurrence of symptoms.   Pulm -- COPD, hx lung cancer, prior lobectomy + XRT. Stable, chronic SOB/ MEDEROS. On nebs TID-QID.   Pre-diabetes -- A1C runs 6's. Not on meds currently.   Hx CVA, positive lupus anti coagulant -- on coumadin  Nephrology -- nephrotic syndrome related to lupus, stable, low dose lisinopril  Rheum -- on chronic prednisone with the lupus arthritis  Derm -- chronic rash/ hyperpigmentation and venous stasis B legs.   Oncology -- hx MALT lymphoma, lung cancer  Osteoporosis -- on fosamax. Hx compression fractures.   HL -- on crestor.   Hypothyroid -- on replacement  HTN -- lasix, lisinopril, potassium      Anesthesia Evaluation     .             ROS/MED HX    ENT/Pulmonary:     (+)COPD, , . .    Neurologic:     (+)CVA     Cardiovascular:     (+) Dyslipidemia, hypertension----. Taking blood thinners : . . MEDEROS (due to pulm disease), . :. . Previous cardiac testing date:results:Stress Testdate: results:Per preop eval:  negative stress test  (atypical chest pain) date: results: date: results:          METS/Exercise Tolerance: Comment: Mostly wheelchair bound prior to stroke 1 - Eating, dressing   Hematologic:     (+) Other Hematologic Disorder-lupus anticoagulant      Musculoskeletal: Comment: Lupus arthritis (taking chronic prednisone)  (+) arthritis,  -       GI/Hepatic:        (-) GERD   Renal/Genitourinary:     (+) chronic renal disease (nephrotic syndrome secondary to  lupus), type: CRI,       Endo: Comment: Pre-diabetes    (+) thyroid problem hypothyroidism, Obesity, .      Psychiatric:         Infectious Disease:         Malignancy:   (+) Malignancy (hx MALT lymphoma, lung ca) History of Lung and Lymphoma/Leukemia  Lung CA Remission status post Radiation.         Other:                          Physical Exam  Normal systems: dental    Airway   Mallampati: II  TM distance: >3 FB  Neck ROM: full    Dental     Cardiovascular   Rhythm and rate: regular      Pulmonary             Lab Results   Component Value Date    WBC 10.9 12/10/2019    HGB 12.9 12/10/2019    HCT 40.9 12/10/2019     12/10/2019     12/10/2019    POTASSIUM 4.1 12/10/2019    CHLORIDE 101 12/10/2019    CO2 29 12/10/2019    BUN 19 12/10/2019    CR 0.94 12/10/2019     (H) 12/10/2019    PATRICIA 9.8 12/10/2019    INR 2.66 (H) 12/11/2019       Preop Vitals  BP Readings from Last 3 Encounters:   12/11/19 (!) 151/62   01/22/16 126/75   11/30/14 124/72    Pulse Readings from Last 3 Encounters:   12/10/19 112   01/22/16 113   11/30/14 105      Resp Readings from Last 3 Encounters:   12/11/19 16    SpO2 Readings from Last 3 Encounters:   12/11/19 96%   01/22/16 99%   11/30/14 97%      Temp Readings from Last 1 Encounters:   12/11/19 36.3  C (97.3  F) (Oral)    Ht Readings from Last 1 Encounters:   12/10/19 1.524 m (5')      Wt Readings from Last 1 Encounters:   12/11/19 81.6 kg (179 lb 12.8 oz)    Estimated body mass index is 35.11 kg/m  as calculated from the following:    Height as of 12/10/19: 1.524 m (5').    Weight as of 12/11/19: 81.6 kg (179 lb 12.8 oz).       Anesthesia Plan      History & Physical Review  History and physical reviewed and following examination; no interval change.    ASA Status:  3 .    NPO Status:  > 8 hours    Plan for MAC Reason for MAC:  Chronic cardiopulmonary disease (G9) and Deep or markedly invasive procedure (G8)  PONV prophylaxis:  Ondansetron (or other 5HT-3)          Postoperative Care  Postoperative pain management:  IV analgesics.      Consents  Anesthetic plan, risks, benefits and alternatives discussed with:  Patient..                 Marty Estrada MD

## 2020-12-28 NOTE — H&P
Pre-Endoscopy History and Physical     Sho Hernandez MRN# [id number]   YOB: 1955 Age: 65 year old     Date of Procedure: 12/28/2020  Primary care provider: Clarissa Poe  Type of Endoscopy: Colonoscopy  Reason for Procedure: H/O polyps  Type of Anesthesia Anticipated: Moderate Sedation    HPI:    Sho is a 65 year old female who will be undergoing the above procedure.      A history and physical has been performed. The patient's medications and allergies have been reviewed. The risks and benefits of the procedure and the sedation options and risks were discussed with the patient.  All questions were answered and informed consent was obtained.      She denies a personal or family history of anesthesia complications or bleeding disorders.     Allergies   Allergen Reactions     Levaquin [Levofloxacin]      Penicillins Unknown        No current facility-administered medications on file prior to encounter.        acetaminophen (TYLENOL) 325 MG tablet, Take 325-650 mg by mouth every 6 hours as needed for mild pain       alendronate (FOSAMAX) 70 MG tablet, Take 70 mg by mouth every 7 days       diphenhydrAMINE (BENADRYL) 25 MG tablet, Take 25 mg by mouth nightly as needed for sleep       folic acid (FOLVITE) 400 MCG tablet, Take 400 mcg by mouth daily       furosemide (LASIX) 40 MG tablet, Take 40 mg by mouth 2 times daily.       ipratropium - albuterol 0.5 mg/2.5 mg/3 mL (DUONEB) 0.5-2.5 (3) MG/3ML neb solution, Take 1 vial by nebulization 4 times daily       levothyroxine (SYNTHROID/LEVOTHROID) 88 MCG tablet, Take 88 mcg by mouth daily       lisinopril (PRINIVIL/ZESTRIL) 2.5 MG tablet, Take 2.5 mg by mouth daily       potassium chloride ER (K-DUR/KLOR-CON M) 20 MEQ CR tablet, Take 20 mEq by mouth 2 times daily       predniSONE (DELTASONE) 5 MG tablet, Take 1 tablet (5 mg) by mouth daily       pyridOXINE (VITAMIN B6) 100 MG TABS, Take 100 mg by mouth daily       rosuvastatin (CRESTOR) 40 MG tablet,  Take 40 mg by mouth At Bedtime       vitamin B-12 (CYANOCOBALAMIN) 500 MCG tablet, Take 500 mcg by mouth daily       vitamin D3 (CHOLECALCIFEROL) 2000 units (50 mcg) tablet, Take 2,000 Units by mouth daily       warfarin (COUMADIN) 5 MG tablet, Take by mouth daily -  For history of stroke   Goal CFX 20-40%  5 mg on Thursday  2.5 mg on all other days        Patient Active Problem List   Diagnosis     Edema     Ingrown toenail without infection     Anticoagulation monitoring, special range     Aphasia due to stroke     CKD (chronic kidney disease) stage 3, GFR 30-59 ml/min     Compression fracture of thoracic vertebra (H)     Current chronic use of systemic steroids     History of CVA (cerebrovascular accident)     Hyperlipidemia     Hypertension     Hypothyroidism     Ischemic colitis (H)     Lupus anticoagulant positive     Membranous lupus nephritis syndrome (H)     Morbid obesity (H)     Nephrotic syndrome     Non-Hodgkin lymphoma (H)     Osteopenia     Rheumatoid arthritis involving multiple sites (H)     S/P colonoscopy with polypectomy     S/P partial lobectomy of lung     Brain lesion     Systemic lupus erythematosus (H)        Past Medical History:   Diagnosis Date     Cancer (H) 03/05/2014    lung        Past Surgical History:   Procedure Laterality Date     THORACIC SURGERY  03/27/2014    2 sections of right lung       Social History     Tobacco Use     Smoking status: Former Smoker     Packs/day: 0.50     Start date: 1/1/2014     Smokeless tobacco: Never Used     Tobacco comment: 5 cigarettes per day - 11/30/14: has quit completely   Substance Use Topics     Alcohol use: No       Family History   Problem Relation Age of Onset     Diabetes Father        REVIEW OF SYSTEMS:     5 point ROS negative except as noted above in HPI, including Gen., Resp., CV, GI &  system review.      PHYSICAL EXAM:   There were no vitals taken for this visit. Estimated body mass index is 35.11 kg/m  as calculated from the  following:    Height as of 12/10/19: 1.524 m (5').    Weight as of 12/11/19: 81.6 kg (179 lb 12.8 oz).   GENERAL APPEARANCE: healthy and alert  MENTAL STATUS: alert  AIRWAY EXAM: Mallampatti Class II (visualization of the soft palate, fauces, and uvula)  RESP: lungs clear to auscultation - no rales, rhonchi or wheezes  CV: regular rates and rhythm      IMPRESSION   ASA Class 3 - Severe systemic disease, but not incapacitating        PLAN:     Plan for colonoscopy. We discussed the risks, benefits and alternatives and the patient wished to proceed.    The above has been forwarded to the consulting provider.      Audrey Rushing MD  [facility]  Phone: [phone]  Fax: [phone]  December 28, 2020

## 2020-12-28 NOTE — ANESTHESIA CARE TRANSFER NOTE
Patient: Sho Hernandez    Procedure(s):  COLONOSCOPY, FLEXIBLE, WITH LESION REMOVAL USING SNARE  Colonoscopy, With Polypectomy And Biopsy    Diagnosis: Special screening for malignant neoplasms, colon [Z12.11]  Diagnosis Additional Information: No value filed.    Anesthesia Type:   MAC     Note:  Airway :Room Air  Patient transferred to:PACU  Comments: VSS, Patient alert and comfortable. Son at bedside in recovery. Handoff Report: Identifed the Patient, Identified the Reponsible Provider, Reviewed the pertinent medical history, Discussed the surgical course, Reviewed Intra-OP anesthesia mangement and issues during anesthesia, Set expectations for post-procedure period and Allowed opportunity for questions and acknowledgement of understanding      Vitals: (Last set prior to Anesthesia Care Transfer)    CRNA VITALS  12/28/2020 0755 - 12/28/2020 0829      12/28/2020             Resp Rate (set):  10                Electronically Signed By: MYRA Howe CRNA  December 28, 2020  8:29 AM

## 2020-12-28 NOTE — OP NOTE
See Provation Note In Chart    Audrey Rushing MD  [facility]  Office Phone # [phone]

## 2020-12-28 NOTE — ANESTHESIA POSTPROCEDURE EVALUATION
Patient: Sho Hernandez    Procedure(s):  COLONOSCOPY, FLEXIBLE, WITH LESION REMOVAL USING SNARE  Colonoscopy, With Polypectomy And Biopsy    Diagnosis:Special screening for malignant neoplasms, colon [Z12.11]  Diagnosis Additional Information: No value filed.    Anesthesia Type:  MAC    Note:  Anesthesia Post Evaluation    Patient location during evaluation: Endoscopy Recovery  Patient participation: Able to fully participate in evaluation  Level of consciousness: awake and alert  Pain management: adequate  Airway patency: patent  Cardiovascular status: hemodynamically stable  Respiratory status: acceptable and room air  Hydration status: euvolemic  PONV: none     Anesthetic complications: None          Last vitals:  Vitals:    12/28/20 0830 12/28/20 0840 12/28/20 0850   BP: 99/53 117/60 114/60   Pulse: 108 104 101   Resp: 18 20 16   Temp:      SpO2: 97% 97% 98%         Electronically Signed By: Marty Estrada MD  December 28, 2020  9:49 AM

## 2020-12-29 LAB — COPATH REPORT: NORMAL

## 2021-01-02 ENCOUNTER — HOSPITAL ENCOUNTER (OUTPATIENT)
Facility: CLINIC | Age: 66
Setting detail: OBSERVATION
Discharge: HOME | End: 2021-01-03
Attending: EMERGENCY MEDICINE | Admitting: INTERNAL MEDICINE
Payer: COMMERCIAL

## 2021-01-02 ENCOUNTER — APPOINTMENT (OUTPATIENT)
Dept: GENERAL RADIOLOGY | Facility: CLINIC | Age: 66
End: 2021-01-02
Attending: EMERGENCY MEDICINE
Payer: COMMERCIAL

## 2021-01-02 DIAGNOSIS — K92.2 ACUTE LOWER GI BLEEDING: ICD-10-CM

## 2021-01-02 DIAGNOSIS — R76.0 LUPUS ANTICOAGULANT POSITIVE: Primary | ICD-10-CM

## 2021-01-02 DIAGNOSIS — K62.5 BRIGHT RED BLOOD PER RECTUM: ICD-10-CM

## 2021-01-02 LAB
ABO + RH BLD: NORMAL
ABO + RH BLD: NORMAL
ANION GAP SERPL CALCULATED.3IONS-SCNC: 3 MMOL/L (ref 3–14)
APTT PPP: 36 SEC (ref 22–37)
BASOPHILS # BLD AUTO: 0.1 10E9/L (ref 0–0.2)
BASOPHILS NFR BLD AUTO: 0.8 %
BLD GP AB SCN SERPL QL: NORMAL
BLOOD BANK CMNT PATIENT-IMP: NORMAL
BUN SERPL-MCNC: 24 MG/DL (ref 7–30)
CALCIUM SERPL-MCNC: 8.7 MG/DL (ref 8.5–10.1)
CHLORIDE SERPL-SCNC: 106 MMOL/L (ref 94–109)
CO2 SERPL-SCNC: 32 MMOL/L (ref 20–32)
CREAT SERPL-MCNC: 1.19 MG/DL (ref 0.52–1.04)
DIFFERENTIAL METHOD BLD: ABNORMAL
EOSINOPHIL # BLD AUTO: 0.3 10E9/L (ref 0–0.7)
EOSINOPHIL NFR BLD AUTO: 2.7 %
ERYTHROCYTE [DISTWIDTH] IN BLOOD BY AUTOMATED COUNT: 15.3 % (ref 10–15)
GFR SERPL CREATININE-BSD FRML MDRD: 48 ML/MIN/{1.73_M2}
GLUCOSE SERPL-MCNC: 123 MG/DL (ref 70–99)
HCT VFR BLD AUTO: 37.3 % (ref 35–47)
HGB BLD-MCNC: 11.1 G/DL (ref 11.7–15.7)
HGB BLD-MCNC: 9.9 G/DL (ref 11.7–15.7)
IMM GRANULOCYTES # BLD: 0.1 10E9/L (ref 0–0.4)
IMM GRANULOCYTES NFR BLD: 0.5 %
INR PPP: 1.11 (ref 0.86–1.14)
INTERPRETATION ECG - MUSE: NORMAL
LABORATORY COMMENT REPORT: NORMAL
LYMPHOCYTES # BLD AUTO: 2.4 10E9/L (ref 0.8–5.3)
LYMPHOCYTES NFR BLD AUTO: 25.4 %
MCH RBC QN AUTO: 27.3 PG (ref 26.5–33)
MCHC RBC AUTO-ENTMCNC: 29.8 G/DL (ref 31.5–36.5)
MCV RBC AUTO: 92 FL (ref 78–100)
MONOCYTES # BLD AUTO: 0.7 10E9/L (ref 0–1.3)
MONOCYTES NFR BLD AUTO: 8 %
NEUTROPHILS # BLD AUTO: 5.8 10E9/L (ref 1.6–8.3)
NEUTROPHILS NFR BLD AUTO: 62.6 %
NRBC # BLD AUTO: 0 10*3/UL
NRBC BLD AUTO-RTO: 0 /100
PLATELET # BLD AUTO: 237 10E9/L (ref 150–450)
POTASSIUM SERPL-SCNC: 3.7 MMOL/L (ref 3.4–5.3)
RBC # BLD AUTO: 4.06 10E12/L (ref 3.8–5.2)
SARS-COV-2 RNA SPEC QL NAA+PROBE: NEGATIVE
SODIUM SERPL-SCNC: 141 MMOL/L (ref 133–144)
SPECIMEN EXP DATE BLD: NORMAL
SPECIMEN SOURCE: NORMAL
WBC # BLD AUTO: 9.3 10E9/L (ref 4–11)

## 2021-01-02 PROCEDURE — 99285 EMERGENCY DEPT VISIT HI MDM: CPT | Mod: 25

## 2021-01-02 PROCEDURE — 250N000013 HC RX MED GY IP 250 OP 250 PS 637: Performed by: PHYSICIAN ASSISTANT

## 2021-01-02 PROCEDURE — 80048 BASIC METABOLIC PNL TOTAL CA: CPT | Performed by: EMERGENCY MEDICINE

## 2021-01-02 PROCEDURE — 250N000009 HC RX 250: Performed by: EMERGENCY MEDICINE

## 2021-01-02 PROCEDURE — 71046 X-RAY EXAM CHEST 2 VIEWS: CPT

## 2021-01-02 PROCEDURE — 86901 BLOOD TYPING SEROLOGIC RH(D): CPT | Performed by: EMERGENCY MEDICINE

## 2021-01-02 PROCEDURE — G0378 HOSPITAL OBSERVATION PER HR: HCPCS

## 2021-01-02 PROCEDURE — 250N000013 HC RX MED GY IP 250 OP 250 PS 637: Performed by: INTERNAL MEDICINE

## 2021-01-02 PROCEDURE — 96374 THER/PROPH/DIAG INJ IV PUSH: CPT | Mod: 59

## 2021-01-02 PROCEDURE — 86850 RBC ANTIBODY SCREEN: CPT | Performed by: EMERGENCY MEDICINE

## 2021-01-02 PROCEDURE — 250N000011 HC RX IP 250 OP 636: Performed by: EMERGENCY MEDICINE

## 2021-01-02 PROCEDURE — 250N000013 HC RX MED GY IP 250 OP 250 PS 637: Performed by: EMERGENCY MEDICINE

## 2021-01-02 PROCEDURE — 85025 COMPLETE CBC W/AUTO DIFF WBC: CPT | Performed by: EMERGENCY MEDICINE

## 2021-01-02 PROCEDURE — 96375 TX/PRO/DX INJ NEW DRUG ADDON: CPT

## 2021-01-02 PROCEDURE — 99220 PR INITIAL OBSERVATION CARE,LEVEL III: CPT | Performed by: INTERNAL MEDICINE

## 2021-01-02 PROCEDURE — 86900 BLOOD TYPING SEROLOGIC ABO: CPT | Performed by: EMERGENCY MEDICINE

## 2021-01-02 PROCEDURE — 87635 SARS-COV-2 COVID-19 AMP PRB: CPT | Performed by: EMERGENCY MEDICINE

## 2021-01-02 PROCEDURE — 96360 HYDRATION IV INFUSION INIT: CPT

## 2021-01-02 PROCEDURE — 258N000003 HC RX IP 258 OP 636: Performed by: INTERNAL MEDICINE

## 2021-01-02 PROCEDURE — C9803 HOPD COVID-19 SPEC COLLECT: HCPCS

## 2021-01-02 PROCEDURE — 36415 COLL VENOUS BLD VENIPUNCTURE: CPT | Performed by: INTERNAL MEDICINE

## 2021-01-02 PROCEDURE — 93005 ELECTROCARDIOGRAM TRACING: CPT

## 2021-01-02 PROCEDURE — 85730 THROMBOPLASTIN TIME PARTIAL: CPT | Performed by: EMERGENCY MEDICINE

## 2021-01-02 PROCEDURE — 96361 HYDRATE IV INFUSION ADD-ON: CPT

## 2021-01-02 PROCEDURE — C9113 INJ PANTOPRAZOLE SODIUM, VIA: HCPCS | Performed by: EMERGENCY MEDICINE

## 2021-01-02 PROCEDURE — 85610 PROTHROMBIN TIME: CPT | Performed by: EMERGENCY MEDICINE

## 2021-01-02 PROCEDURE — 94640 AIRWAY INHALATION TREATMENT: CPT

## 2021-01-02 PROCEDURE — 250N000009 HC RX 250: Performed by: PHYSICIAN ASSISTANT

## 2021-01-02 PROCEDURE — 85018 HEMOGLOBIN: CPT | Performed by: INTERNAL MEDICINE

## 2021-01-02 RX ORDER — IPRATROPIUM BROMIDE AND ALBUTEROL SULFATE 2.5; .5 MG/3ML; MG/3ML
1 SOLUTION RESPIRATORY (INHALATION) 4 TIMES DAILY
Status: DISCONTINUED | OUTPATIENT
Start: 2021-01-02 | End: 2021-01-03 | Stop reason: HOSPADM

## 2021-01-02 RX ORDER — DIPHENHYDRAMINE HCL 25 MG
25 CAPSULE ORAL
Status: DISCONTINUED | OUTPATIENT
Start: 2021-01-02 | End: 2021-01-03 | Stop reason: HOSPADM

## 2021-01-02 RX ORDER — SODIUM CHLORIDE, SODIUM LACTATE, POTASSIUM CHLORIDE, CALCIUM CHLORIDE 600; 310; 30; 20 MG/100ML; MG/100ML; MG/100ML; MG/100ML
INJECTION, SOLUTION INTRAVENOUS CONTINUOUS
Status: DISCONTINUED | OUTPATIENT
Start: 2021-01-02 | End: 2021-01-03

## 2021-01-02 RX ORDER — AMOXICILLIN 250 MG
2 CAPSULE ORAL 2 TIMES DAILY
Status: DISCONTINUED | OUTPATIENT
Start: 2021-01-02 | End: 2021-01-02

## 2021-01-02 RX ORDER — ONDANSETRON 2 MG/ML
4 INJECTION INTRAMUSCULAR; INTRAVENOUS ONCE
Status: COMPLETED | OUTPATIENT
Start: 2021-01-02 | End: 2021-01-02

## 2021-01-02 RX ORDER — ACETAMINOPHEN 500 MG
1000 TABLET ORAL ONCE
Status: COMPLETED | OUTPATIENT
Start: 2021-01-02 | End: 2021-01-02

## 2021-01-02 RX ORDER — ONDANSETRON 2 MG/ML
4 INJECTION INTRAMUSCULAR; INTRAVENOUS EVERY 6 HOURS PRN
Status: DISCONTINUED | OUTPATIENT
Start: 2021-01-02 | End: 2021-01-03 | Stop reason: HOSPADM

## 2021-01-02 RX ORDER — POLYETHYLENE GLYCOL 3350 17 G/17G
17 POWDER, FOR SOLUTION ORAL DAILY
Status: DISCONTINUED | OUTPATIENT
Start: 2021-01-02 | End: 2021-01-02

## 2021-01-02 RX ORDER — PROCHLORPERAZINE 25 MG
12.5 SUPPOSITORY, RECTAL RECTAL EVERY 12 HOURS PRN
Status: DISCONTINUED | OUTPATIENT
Start: 2021-01-02 | End: 2021-01-03 | Stop reason: HOSPADM

## 2021-01-02 RX ORDER — LANOLIN ALCOHOL/MO/W.PET/CERES
400 CREAM (GRAM) TOPICAL 3 TIMES DAILY
COMMUNITY

## 2021-01-02 RX ORDER — LANOLIN ALCOHOL/MO/W.PET/CERES
3 CREAM (GRAM) TOPICAL
Status: DISCONTINUED | OUTPATIENT
Start: 2021-01-02 | End: 2021-01-03 | Stop reason: HOSPADM

## 2021-01-02 RX ORDER — AMOXICILLIN 250 MG
1 CAPSULE ORAL 2 TIMES DAILY
Status: DISCONTINUED | OUTPATIENT
Start: 2021-01-02 | End: 2021-01-02

## 2021-01-02 RX ORDER — ACETAMINOPHEN 325 MG/1
650 TABLET ORAL EVERY 4 HOURS PRN
Status: DISCONTINUED | OUTPATIENT
Start: 2021-01-02 | End: 2021-01-03 | Stop reason: HOSPADM

## 2021-01-02 RX ORDER — ONDANSETRON 4 MG/1
4 TABLET, ORALLY DISINTEGRATING ORAL EVERY 6 HOURS PRN
Status: DISCONTINUED | OUTPATIENT
Start: 2021-01-02 | End: 2021-01-03 | Stop reason: HOSPADM

## 2021-01-02 RX ORDER — ACETAMINOPHEN 650 MG/1
650 SUPPOSITORY RECTAL EVERY 4 HOURS PRN
Status: DISCONTINUED | OUTPATIENT
Start: 2021-01-02 | End: 2021-01-03 | Stop reason: HOSPADM

## 2021-01-02 RX ORDER — PROCHLORPERAZINE MALEATE 5 MG
5 TABLET ORAL EVERY 6 HOURS PRN
Status: DISCONTINUED | OUTPATIENT
Start: 2021-01-02 | End: 2021-01-03 | Stop reason: HOSPADM

## 2021-01-02 RX ORDER — IPRATROPIUM BROMIDE AND ALBUTEROL SULFATE 2.5; .5 MG/3ML; MG/3ML
3 SOLUTION RESPIRATORY (INHALATION) ONCE
Status: COMPLETED | OUTPATIENT
Start: 2021-01-02 | End: 2021-01-02

## 2021-01-02 RX ORDER — WARFARIN SODIUM 2.5 MG/1
2.5 TABLET ORAL DAILY
Status: ON HOLD | COMMUNITY
End: 2021-01-03

## 2021-01-02 RX ADMIN — DIPHENHYDRAMINE HYDROCHLORIDE 25 MG: 25 CAPSULE ORAL at 21:46

## 2021-01-02 RX ADMIN — SODIUM CHLORIDE, POTASSIUM CHLORIDE, SODIUM LACTATE AND CALCIUM CHLORIDE: 600; 310; 30; 20 INJECTION, SOLUTION INTRAVENOUS at 21:48

## 2021-01-02 RX ADMIN — SODIUM CHLORIDE, POTASSIUM CHLORIDE, SODIUM LACTATE AND CALCIUM CHLORIDE: 600; 310; 30; 20 INJECTION, SOLUTION INTRAVENOUS at 08:49

## 2021-01-02 RX ADMIN — ACETAMINOPHEN 1000 MG: 500 TABLET, FILM COATED ORAL at 06:26

## 2021-01-02 RX ADMIN — IPRATROPIUM BROMIDE AND ALBUTEROL SULFATE 3 ML: .5; 3 SOLUTION RESPIRATORY (INHALATION) at 21:13

## 2021-01-02 RX ADMIN — MELATONIN 3 MG: 3 TAB ORAL at 21:46

## 2021-01-02 RX ADMIN — ONDANSETRON 4 MG: 2 INJECTION INTRAMUSCULAR; INTRAVENOUS at 06:26

## 2021-01-02 RX ADMIN — ACETAMINOPHEN 650 MG: 325 TABLET, FILM COATED ORAL at 23:39

## 2021-01-02 RX ADMIN — ACETAMINOPHEN 650 MG: 325 TABLET, FILM COATED ORAL at 18:23

## 2021-01-02 RX ADMIN — IPRATROPIUM BROMIDE AND ALBUTEROL SULFATE 3 ML: .5; 3 SOLUTION RESPIRATORY (INHALATION) at 05:43

## 2021-01-02 RX ADMIN — PANTOPRAZOLE SODIUM 40 MG: 40 INJECTION, POWDER, FOR SOLUTION INTRAVENOUS at 05:43

## 2021-01-02 ASSESSMENT — ENCOUNTER SYMPTOMS
ABDOMINAL PAIN: 0
SHORTNESS OF BREATH: 1
BLOOD IN STOOL: 1
LIGHT-HEADEDNESS: 1
WEAKNESS: 1
SPEECH DIFFICULTY: 1

## 2021-01-02 ASSESSMENT — MIFFLIN-ST. JEOR: SCORE: 1357.82

## 2021-01-02 NOTE — PLAN OF CARE
RECEIVING UNIT ED HANDOFF REVIEW    ED Nurse Handoff Report was reviewed by: Clarissa Alejo RN on January 2, 2021 at 8:09 AM

## 2021-01-02 NOTE — PHARMACY-ADMISSION MEDICATION HISTORY
Pharmacy Medication History  Admission medication history interview status for the 1/2/2021  admission is complete. See EPIC admission navigator for prior to admission medications     Medication history sources: Patient's family/friend ( Dionte), Surescripts and Care Everywhere  Location of interview: Phone  Adherence Assessment: Good    Significant changes made to the medication list:  1. Added enoxaparin   2. Updated warfarin regimen per  report     Additional medication history information:   1. Patient restarted warfarin last night and was instructed by doctor to take 1.5 tablets (3.75 mg) this night, and then go back to normal regimen of 1 tablet (2.5 mg) daily thereafter.   2. Patient's  was not sure of strengths of vitamins (B6, B12, D3, folic acid). Used CareEverywhere to verify these strengths.     Medication reconciliation completed by provider prior to medication history? No    Time spent in this activity: 15 minutes    Prior to Admission medications    Medication Sig Last Dose Taking? Auth Provider   acetaminophen (TYLENOL) 325 MG tablet Take 325-650 mg by mouth every 6 hours as needed for mild pain PRN Yes Unknown, Entered By History   alendronate (FOSAMAX) 70 MG tablet Take 70 mg by mouth every 7 days (on Sunday) 12/27/2020 Yes Unknown, Entered By History   diphenhydrAMINE (BENADRYL) 25 MG tablet Take 25 mg by mouth nightly as needed for sleep PRN Yes Unknown, Entered By History   enoxaparin ANTICOAGULANT (LOVENOX) 60 MG/0.6ML syringe Inject 60 mg Subcutaneous 2 times daily 1/1/2021 at PM Yes Unknown, Entered By History   folic acid (FOLVITE) 400 MCG tablet Take 400 mcg by mouth 3 times daily 1/1/2021 at PM Yes Unknown, Entered By History   furosemide (LASIX) 40 MG tablet Take 40 mg by mouth 2 times daily. 1/1/2021 at PM Yes Reported, Patient   ipratropium - albuterol 0.5 mg/2.5 mg/3 mL (DUONEB) 0.5-2.5 (3) MG/3ML neb solution Take 1 vial by nebulization 4 times daily 1/1/2021 at PM  Yes Unknown, Entered By History   levothyroxine (SYNTHROID/LEVOTHROID) 88 MCG tablet Take 88 mcg by mouth daily 1/1/2021 at AM Yes Unknown, Entered By History   lisinopril (PRINIVIL/ZESTRIL) 2.5 MG tablet Take 2.5 mg by mouth daily 1/1/2021 at AM Yes Unknown, Entered By History   potassium chloride ER (K-DUR/KLOR-CON M) 20 MEQ CR tablet Take 20 mEq by mouth 2 times daily 1/1/2021 at PM Yes Unknown, Entered By History   predniSONE (DELTASONE) 5 MG tablet Take 1 tablet (5 mg) by mouth daily 1/1/2021 at AM Yes Jose Person PA-C   pyridOXINE (VITAMIN B6) 100 MG TABS Take 100 mg by mouth daily 1/1/2021 at AM Yes Unknown, Entered By History   rosuvastatin (CRESTOR) 40 MG tablet Take 40 mg by mouth At Bedtime 1/1/2021 at HS Yes Unknown, Entered By History   vitamin B-12 (CYANOCOBALAMIN) 500 MCG tablet Take 500 mcg by mouth daily 1/1/2021 at AM Yes Unknown, Entered By History   vitamin D3 (CHOLECALCIFEROL) 2000 units (50 mcg) tablet Take 2,000 Units by mouth daily 1/1/2021 at AM Yes Unknown, Entered By History   warfarin ANTICOAGULANT (COUMADIN) 2.5 MG tablet Take 2.5 mg by mouth daily 1/1/2021 at 3.75 mg @ HS Yes Unknown, Entered By History     The information provided in this note is only as accurate as the sources available at the time of the update(s).

## 2021-01-02 NOTE — H&P
[facility]  History and Physical  Hospitalist       Date of Admission:  1/2/2021    Assessment & Plan   Sho Hernandez is a very pleasant 65 year old female with complex PMH including h/o L MCA stroke with residual aphasia, hypertension, dyslipidemia, COPD, lupus anticoagulant positive, systemic lupus erythematosus on chronic low dose prednisone, CKD stage 3 with h/o lupus nephritis, h/o non-hodgkin lymphoma, h/o adenocarcinoma of right lung s/p partial lobectomy, rheumatoid arthritis, h/o ischemic colitis, h/o colon polyps who was admitted on 1/2/2021 with lower GI bleeding 5 days after colonoscopy with 8 polypectomies and 2 days after resuming therapeutic anticoagulation.    Lower GI bleed  Occurring 5 days after polypectomies and 2 days after resuming therapeutic anticoagulation. Hemoglobin on arrival 11.1 and when last checked 11/3/20 it was 11.6. The patient is tachycardic (sinus rhythm) but has been noted to have similar tachycardia during prior ED visits and appears very anxious on admission. No indication for transfusion at this time. Doubt ischemic bowel given no abdominal pain. Doubt infectious etioology given no fever and WBC count normal.   -admit to observation  -serial hemoglobin checks q 8 hours  -clear liquid diet for now  -colorectal surgery consultation  -blood transfusion consent obtained in ED    H/o left MCA embolic stroke with residual aphasia and comprehension deficits  Lupus Anticoagulant positive  On chronic warfarin.   -Will need to follow chromogenic factor X    Hypertension  Managed PTA with lisinopril and lasix.  -Hold above for now given hypotension. Resume when able to tolerate.     Systemic Lupus Erythematosus  Managed PTA with low dose daily prednisone.  -continue PTA prednisone once dose verified  -could potentially need stress dose steroids if repeat procedure needed    CKD stage 3  Membranous lupus nephritis syndrome  Nephrotic syndrome  Creatinine on  admission 1.19 with GFR 48 which is her baseline.   -avoid nephrotoxins    Chronic, stable problems:  Dyslipidemia Hold statin for now  Hypothyroidism Hold levothyroxine for now  Thoracic compression fractures, osteoporosis. Avoid NSAIDs.   COPD Resume prior to admission inhaler regimen.    Asymptomatic Rule Out of COVID-19 infection  This patient was evaluated during a global COVID-19 pandemic, which necessitated consideration that the patient might be at risk for infection with the SARS-CoV-2 virus that causes COVID-19. Applicable protocols for evaluation were followed during the patient's care. Low suspicion for infection.   -follow-up COVID-19 PCR test result => NEGATIVE. No need for special precautions.     Hold all nonessential medications, vitamins, supplements given observation status.  Allow patient to take medications from his home supply if desired. These would need to be reviewed by pharmacy prior to administration.    DVT prophylaxis: VTE Prophylaxis contraindicated due to active GI bleeding  Code Status: Full    Disposition: Expected discharge in 1-2 days to home with family.    Martha Valenzuela MD  Text Page    ~~~~~~~~~~~~~~~~~~~~~~~~~~~~~~~~~~~~~~~~~~~~~~~~~~~~~  Primary Care Physician   Clarissa Poe    Chief Complaint   Lower GI bleeding    History is obtained from the patient and medical records.    History of Present Illness   Sho Hernandez is a very pleasant 65 year old female with complex PMH including h/o L MCA stroke with residual aphasia, hypertension, dyslipidemia, COPD, lupus anticoagulant positive, systemic lupus erythematosus, CKD stage 3 with h/o lupus nephritis, h/o non-hodgkin lymphoma, h/o adenocarcinoma of right lung s/p partial lobectomy, rheumatoid arthritis, h/o ischemic colitis, h/o colon polyps who presents with with lower GI bleeding. Due to the patient's post-stroke aphasia, most of the HPI is obtained from her attentive  and the electronic medical record.      The patient had a scheduled colonoscopy by Dr. Rushing from colorectal surgery on 12/28/20. Per that report there were 8 polyps removed. There were arrangements for the patient to hold her therapeutic anticoagulation prior to the procedure and only resume lovenox after 2 days, then resume warfarin. These instructions were followed as described by the patient's .     This middle-of-the-night ED visit was prompted by the patient having a bloody bowel movement at home. She arose to use the bathroom during the night. Her  went to check on her and found her on the bathroom floor too weak to get up. He noted copious dark red blood in the toilet bowl. EMS was summoned and patient brought to the ED. Blood pressure 93/43 initially with pulse 129. No abdominal or rectal pain reported. Denies hemoptysis or hematemesis. No heartburn symptoms. Denies fever, chills, ill contacts. Takes chronic prednisone for SLE.     Case reviewed with Dr. Tavarez from the Emergency Department. Labs and available imaging reviewed. Pertinent results include: hemoglobin 11.1.     Past Medical History    I have reviewed this patient's medical history and updated it with pertinent information if needed.   Past Medical History:   Diagnosis Date     Cancer (H) 03/05/2014    lung       Past Surgical History   I have reviewed this patient's surgical history and updated it with pertinent information if needed.  Past Surgical History:   Procedure Laterality Date     COLONOSCOPY N/A 12/28/2020    Procedure: Colonoscopy, With Polypectomy And Biopsy;  Surgeon: Evelina Rushing MD;  Location:  GI     THORACIC SURGERY  03/27/2014    2 sections of right lung       Prior to Admission Medications   Prior to Admission Medications   Prescriptions Last Dose Informant Patient Reported? Taking?   acetaminophen (TYLENOL) 325 MG tablet  Son Yes No   Sig: Take 325-650 mg by mouth every 6 hours as needed for mild pain   alendronate (FOSAMAX) 70 MG  tablet  Son Yes No   Sig: Take 70 mg by mouth every 7 days   diphenhydrAMINE (BENADRYL) 25 MG tablet  Son Yes No   Sig: Take 25 mg by mouth nightly as needed for sleep   folic acid (FOLVITE) 400 MCG tablet  Son Yes No   Sig: Take 400 mcg by mouth daily   furosemide (LASIX) 40 MG tablet  Son Yes No   Sig: Take 40 mg by mouth 2 times daily.   ipratropium - albuterol 0.5 mg/2.5 mg/3 mL (DUONEB) 0.5-2.5 (3) MG/3ML neb solution  Son Yes No   Sig: Take 1 vial by nebulization 4 times daily   levothyroxine (SYNTHROID/LEVOTHROID) 88 MCG tablet  Son Yes No   Sig: Take 88 mcg by mouth daily   lisinopril (PRINIVIL/ZESTRIL) 2.5 MG tablet  Son Yes No   Sig: Take 2.5 mg by mouth daily   potassium chloride ER (K-DUR/KLOR-CON M) 20 MEQ CR tablet  Son Yes No   Sig: Take 20 mEq by mouth 2 times daily   predniSONE (DELTASONE) 5 MG tablet   Yes No   Sig: Take 1 tablet (5 mg) by mouth daily   pyridOXINE (VITAMIN B6) 100 MG TABS  Son Yes No   Sig: Take 100 mg by mouth daily   rosuvastatin (CRESTOR) 40 MG tablet  Son Yes No   Sig: Take 40 mg by mouth At Bedtime   vitamin B-12 (CYANOCOBALAMIN) 500 MCG tablet  Son Yes No   Sig: Take 500 mcg by mouth daily   vitamin D3 (CHOLECALCIFEROL) 2000 units (50 mcg) tablet  Son Yes No   Sig: Take 2,000 Units by mouth daily   warfarin (COUMADIN) 5 MG tablet  Son Yes No   Sig: Take by mouth daily -  For history of stroke   Goal CFX 20-40%  5 mg on Thursday  2.5 mg on all other days      Facility-Administered Medications: None     Allergies   Allergies   Allergen Reactions     Levaquin [Levofloxacin]      Penicillins Unknown       Social History   I have reviewed this patient's social history and updated it with pertinent information if needed. Sho CHADWICK Hernandez  reports that she has quit smoking. She started smoking about 7 years ago. She smoked 0.50 packs per day. She has never used smokeless tobacco. She reports that she does not drink alcohol or use drugs.    Family History   I have reviewed this  patient's family history and updated it with pertinent information if needed.   Family History   Problem Relation Age of Onset     Diabetes Father        Review of Systems   The 10 point Review of Systems is negative other than noted in the HPI or here.    Physical Exam   Temp: 98  F (36.7  C) Temp src: Oral BP: 115/71 Pulse: 118   Resp: 20 SpO2: 97 % O2 Device: None (Room air)    Vital Signs with Ranges  Temp:  [98  F (36.7  C)] 98  F (36.7  C)  Pulse:  [118-129] 118  Resp:  [17-20] 20  BP: ()/(43-71) 115/71  SpO2:  [94 %-97 %] 97 %  193 lbs 0 oz    Constitutional: Appears uncomfortable on the gurney, no respiratory distress  Eyes: PERRL, sclerae anicteric  HEENT: mmm, atraumatic  Back: +kyphosis  Respiratory: Lungs CTAB, no wheezes or crackles  No chest wall tenderness to palpation  Cardiovascular: RRR, no murmurs  2+ LE edema  GI: soft, non-tender, nondistended  Skin/Integument: warm, dry, no acute rashes  Venous stasis changes and hemosiderin deposits on lower extremities  Genitourinary: not examined  Neuro: says some words but mostly aphasic, no tremors  Psych: very anxious, not confused      Data   Data reviewed today:  I personally reviewed: Chest xray with known thoracic compression fractures but no infiltrates or effusions.   Recent Labs   Lab 01/02/21  0427   WBC 9.3   HGB 11.1*   MCV 92      INR 1.11      POTASSIUM 3.7   CHLORIDE 106   CO2 32   BUN 24   CR 1.19*   ANIONGAP 3   PATRICIA 8.7   *       Imaging:  Recent Results (from the past 24 hour(s))   XR Chest 2 Views    Narrative    EXAM: XR CHEST 2 VW  LOCATION: [facility]  DATE/TIME: 1/2/2021 5:15 AM    INDICATION: Shortness of breath, GI bleed  COMPARISON: None.      Impression    IMPRESSION: Low lung volumes. No pneumothorax or pleural effusion. No focal consolidation. Cardiac silhouette within normal limits. Multiple thoracic compression deformities.

## 2021-01-02 NOTE — ED NOTES
"[facility]  ED Nurse Handoff Report    ED Chief complaint: Melena (bloody stool post colonoscopy on monday)      ED Diagnosis:   Final diagnoses:   None       Code Status: Admitting provider to address.     Allergies:   Allergies   Allergen Reactions     Levaquin [Levofloxacin]      Penicillins Unknown       Patient Story: Pt is a 65 year old female who presented to the emergency room tonight with rectal bleeding.  Pt is 8 days post colonoscopy, she was started on coumadin and lovenox. Her  was woken by their daughter at about 0320 today after she found the patient in the bathroom with a large amount of dark red blood in the toilet bowl.  She was too weak and lightheaded to stand on her own. Pt usually ambulates independently with a walker. EMS reported hypotensive but gave her 450 mls fluids. Pt receives 4 mg Zofran en route.     Focused Assessment:  Pt had a stroke in 2013, Aphasic, understands well and responds to yes and no.     Treatments and/or interventions provided: Labs, xray, Covid swab    Patient's response to treatments and/or interventions: tolerating well.     To be done/followed up on inpatient unit:      Does this patient have any cognitive concerns?: Awake, alert and orientated.    Activity level - Baseline/Home:  Stand with Assist  Activity Level - Current:   Stand with assist x2    Patient's Preferred language: English   Needed?: No    Isolation: COVID r/o and special precautions  Infection: COVID r/o and special precautions  Patient tested for COVID 19 prior to admission: YES  Bariatric?: No    Vital Signs:   Vitals:    01/02/21 0435 01/02/21 0445 01/02/21 0500 01/02/21 0530   BP: 96/43 104/62 93/62 115/71   Pulse: 129 118 122 118   Resp: 19 17 20    Temp: 98  F (36.7  C)      TempSrc: Oral      SpO2: 94% 96% 96% 97%   Weight: 87.5 kg (193 lb)      Height: 1.549 m (5' 1\")          Cardiac Rhythm:Cardiac Rhythm: Sinus tachycardia    Was the PSS-3 completed:   " Yes  What interventions are required if any?               Family Comments:  at bedside  OBS brochure/video discussed/provided to patient/family: No              Name of person given brochure if not patient:               Relationship to patient:     For the majority of the shift this patient's behavior was Green.   Behavioral interventions performed were None.    ED NURSE PHONE NUMBER: *[id number]

## 2021-01-02 NOTE — ED PROVIDER NOTES
History   Chief Complaint:  Rectal bleeding     HPI   History is obtained primarily from the patient's  as patient is aphasic.    Sho Hernandez is a 65 year old female with history of stroke with residual aphasia, lung cancer, lymphoma, SLE, CKD, COPD, and anticoagulated on Coumadin who presents via EMS with rectal bleeding.  The patient's  reports the patient had a colonoscopy 5 days ago with 8 polyps removed.  This was done here at [facility] by Evelina Rushing with [facility].  The patient restarted her Lovenox 2 days ago (60 mg twice daily) and then her Coumadin last night.  Her  was woken by their daughter at about 0320 today after she found the patient in the bathroom with a large amount of dark red blood in the toilet bowl.  She was too weak and lightheaded to stand on her own.  By time of EMS arrival, she had several more grossly bloody bowel movements.  She was tachycardic with initial systolic blood pressure for EMS of 80, improved to 120s en route after about 450 mL of saline.  She also received 4 mg of Zofran.  The patient denies any pain however does feel short of breath.  Per , last dose of Lovenox was approximately 11 hours prior to presentation.  Her Coumadin dose last night was 3.75 mg.    Review of Systems   Unable to perform ROS: Patient nonverbal   Respiratory: Positive for shortness of breath.    Gastrointestinal: Positive for blood in stool. Negative for abdominal pain.   Neurological: Positive for speech difficulty, weakness and light-headedness.   ROS limited by aphasia.    Allergies:  Levaquin  Penicillins     Medications:  Coumadin  Lovenox  Lisinopril   Lasix  Potassium chloride   Rosuvastatin   Fosamax  Prednisone   Levothyroxine   Vitamin D  Folic acid  Vitamin B12  Duoneb solution    Past Medical History:    Chronic kidney disease   Rheumatoid arthritis   Systemic lupus erythematosus  Adenocarcinoma of right lung  Non-Hodgkin  "lymphoma   Stroke - left MCA  Aphasia   Complex partial seizures   Hyperlipidemia   Hypertension   Hypothyroidism   Membranous lupus nephritis syndrome  Chronic obstructive pulmonary disease   Brain lesion  Thoracic compression fracture    Ischemic colitis    Past Surgical History:    Colonoscopy 2020  Lung resection   Eye surgery   section   Breast biopsy      Family History:    Diabetes - father   Hypertension - mother    Social History:  Presents to the ED with her Dionte  Marital status:     Physical Exam     Patient Vitals for the past 24 hrs:   BP Temp Temp src Pulse Resp SpO2 Height Weight   21 0530 115/71 -- -- 118 -- 97 % -- --   21 0500 93/62 -- -- 122 20 96 % -- --   21 0445 104/62 -- -- 118 17 96 % -- --   21 0435 96/43 98  F (36.7  C) Oral 129 19 94 % 1.549 m (5' 1\") 87.5 kg (193 lb)       Physical Exam  General: Alert and cooperative with exam. Patient in moderate distress. Baseline mentation, history of aphasia and chronic right-sided weakness from previous stroke.  Anxious appearance  Head:  Scalp is NC/AT  Eyes:  No scleral icterus, PERRL  ENT:  The external nose and ears are normal. The oropharynx is normal and without erythema; mucus membranes are moist. Uvula midline.  Neck:  Normal range of motion without rigidity.  CV:  Tachycardic with regular rhythm  Resp:  Breath sounds are clear bilaterally    Non-labored, no retractions or accessory muscle use  GI:  Abdomen is soft, no distension, no tenderness. No peritoneal signs  Rectal: Melena/bright red blood. Normal rectal tone.  MS:  No lower extremity edema   Skin:  Warm and dry, No rash or lesions noted.  Neuro: Baseline mentation, history of aphasia and chronic right-sided weakness from previous stroke     Emergency Department Course     ECG:  @ 0508  Indication: shortness of breath   Vent. Rate 119 bpm. NV interval 138 ms. QRS duration 132 ms. QT/QTc 340/478 ms. P-R-T axis 38 -10 9.   Sinus " tachycardia. Right bundle branch block. Abnormal ECG.    Read @ 0503 by Dr. Tavarez.      Imaging:  Chest X-ray (PA and lateral):  Low lung volumes. No pneumothorax or pleural effusion. No focal consolidation. Cardiac silhouette within normal limits. Multiple thoracic compression deformities.  Report per radiology.     Laboratory:  CBC: HGB 11.1 (L), MCHC 29.8 (L), RDW 15.3 (H), otherwise WNL (WBC 9.3, )    BMP: Glucose 123 (H), Creatinine 1.19 (H), GFR 48 (L), otherwise WNL   INR: 1.11  PTT: 36  ABO/Rh Type and Screen: A positive, antibody screen negative     Asymptomatic COVID19 Virus PCR, nasopharyngeal: negative     Emergency Department Course:  Reviewed:  I reviewed nursing notes, vitals, past medical history and Care Everywhere    Assessments:  (2178) I performed an initial evaluation and exam of the patient.   (0399) I reevaluated the patient. Findings and plan explained to the patient and  who consents to observation.     Consults:  (9375) I discussed the patient with Dr. Martha Valenzuela of the hospitalist service, who will place the patient in observation for further monitoring, evaluation, and treatment.       Interventions:  (8343) Protonix, 40 mg, IV injection   (5543) Albuterol-Ipratropium inhalation solution, 2.5 mg-0.5 mg/3 ml; 3 mL, inhalation     Disposition:  The patient was admitted to obs under the care of Dr. ARMINDA Valenzuela the hospitalist service.     Impression & Plan   Covid-19  Sho Hernandez was evaluated during a global COVID-19 pandemic, which necessitated consideration that the patient might be at risk for infection with the SARS-CoV-2 virus that causes COVID-19.   Applicable protocols for evaluation were followed during the patient's care.   COVID-19 was considered as part of the patient's evaluation. The plan for testing is:  a test was obtained during this visit.    Medical Decision Making:  Sho Hernandez is a 65 year old female presents for evaluation of melena and bright  red blood per rectum. This started approximately 1 hour prior to arrival. Upon arrival to the emergency department, the patient's vital signs showed tachycardia.  Patient normotensive on arrival after 450 cc IV fluid administered by EMS.  Two IVs were placed and a type and screen was obtained, as well as standard labs. The patient's hemoglobin was 11.1.  Additional episodes of bleeding in the ED during my care.  ETT and INR normal and it has been nearly 12 hours since last dose of Lovenox; no indication for reversal or transfusion at this time.  The patient did remain hemodynamically stable while in the emergency department; no indication for emergent colorectal/GI consultation.  Patient noted chronic shortness of breath and was provided regularly scheduled DuoNeb with noted improvement.  Additionally provided Tylenol and Zofran for additional symptom relief.  Patient's abdominal exam is benign and chest x-ray without significant findings; perforation felt to be unlikely.  Provided 40 mg Protonix in case there is an upper GI component.    The bleeding is most likely lower GI in nature, and further workup will be initiated by colorectal.  In terms of risk factors for GI bleeding, the patient is on blood thinners underwent recent colonoscopy with polyp removal.    The patient was discussed with the hospitalist for admission to telemetry and will be discussed with colorectal by the hospitalist.     Diagnosis:    ICD-10-CM    1. Bright red blood per rectum  K62.5    2. Acute lower GI bleeding  K92.2          Scribe Disclosure:  I, Pat Coley, am serving as a scribe at 4:28 AM on 1/2/2021 to document services personally performed by Zeus Tavarez DO based on my observations and the provider's statements to me.         Zeus Tavarez DO  01/02/21 0622

## 2021-01-02 NOTE — CONSULTS
[facility]  Colon and Rectal Surgery Consult Note  Name: Sho Hernandez    MRN: [id number]  YOB: 1955    Age: 65 year old  Date of admission: 1/2/2021  Primary care provider: Clarissa Poe     Requesting Physician:  Martha Valenzuela MD  Reason for consult:  GI bleed           History of Present Illness:   Sho Hernandez is a 65 year old female, seen at the request of Dr. Valenzuela, presents with GI bleed.  64 yo F with history of lupus and h/o stroke on anticoagulation.  Underwent colonoscopy on 12/28/20.  Several polyps identified and removed.  Several polypectomy sites clipped.  Therapeutic lovenox started Thursday.  Coumadin started Friday.  Had significant bleeding last night with some lightheadedness.  Had some crampy abdominal pain in ED, but no pain currently.  No further bleeding since admission. Was tachycardic and hypotensive for EMS and this improved with fluids.  She is hungry.  Went to toilet this AM.  Clear urine.  No stool or blood.          Colonoscopy History:  12/28/2020.  See provation note            Past Medical History:     Past Medical History:   Diagnosis Date     Cancer (H) 03/05/2014    lung             Past Surgical History:     Past Surgical History:   Procedure Laterality Date     COLONOSCOPY N/A 12/28/2020    Procedure: Colonoscopy, With Polypectomy And Biopsy;  Surgeon: Evelina Rushing MD;  Location:  GI     THORACIC SURGERY  03/27/2014    2 sections of right lung               Social History:     Social History     Tobacco Use     Smoking status: Former Smoker     Packs/day: 0.50     Start date: 1/1/2014     Smokeless tobacco: Never Used     Tobacco comment: 5 cigarettes per day - 11/30/14: has quit completely   Substance Use Topics     Alcohol use: No             Family History:     Family History   Problem Relation Age of Onset     Diabetes Father              Allergies:     Allergies   Allergen Reactions     Levaquin [Levofloxacin]      Penicillins Unknown  "            Medications:             Review of Systems:   A comprehensive greater than 10 system review of systems was carried out.  Pertinent positives and negatives are noted above.  Otherwise negative for contributory info.            Physical Exam:     Blood pressure 109/55, pulse 118, temperature 97.2  F (36.2  C), temperature source Oral, resp. rate 18, height 1.549 m (5' 1\"), weight 87.5 kg (193 lb), SpO2 97 %.    Intake/Output Summary (Last 24 hours) at 1/2/2021 1107  Last data filed at 1/2/2021 0900  Gross per 24 hour   Intake 200 ml   Output --   Net 200 ml     Exam:  General - Awake alert and oriented, appears older stated age  Head, Eyes, ENT: normocephalic, atraumatic, pupils equal, round and sclera anicteric  Pulm - mildly breathing with normal respiratory effort  CVS - reg rate and rhythm, no peripheral edema, chronic skin changes in bilateral lower extremities  Abd - soft, mildly distended,  Non tender, Rectal exam was not performed.   Neuro - CN II-XII grossly intact  Musculoskeletal - extremities with no clubbing, cyanosis or edema, but chronic skin changes ; able to ambulate slowly with walker  Psych - responsive, alert, cooperative; oriented x3; appropriate mood and affect  External/skin - inspection reveals no rashes, lesions or ulcers, normal coloring           Data Reviewed:     Results for orders placed or performed during the hospital encounter of 01/02/21   XR Chest 2 Views    Narrative    EXAM: XR CHEST 2 VW  LOCATION: [facility]  DATE/TIME: 1/2/2021 5:15 AM    INDICATION: Shortness of breath, GI bleed  COMPARISON: None.      Impression    IMPRESSION: Low lung volumes. No pneumothorax or pleural effusion. No focal consolidation. Cardiac silhouette within normal limits. Multiple thoracic compression deformities.       Recent Labs   Lab 01/02/21  0427   WBC 9.3   HGB 11.1*   HCT 37.3   MCV 92             Lab Results   Component Value Date     01/02/2021     " 12/10/2019     11/30/2012    Lab Results   Component Value Date    CHLORIDE 106 01/02/2021    CHLORIDE 101 12/10/2019    CHLORIDE 106 11/30/2012    Lab Results   Component Value Date    BUN 24 01/02/2021    BUN 19 12/10/2019    BUN 23 11/30/2012      Lab Results   Component Value Date    POTASSIUM 3.7 01/02/2021    POTASSIUM 4.1 12/10/2019    POTASSIUM 3.5 11/30/2012    Lab Results   Component Value Date    CO2 32 01/02/2021    CO2 29 12/10/2019    CO2 28 11/30/2012    Lab Results   Component Value Date    CR 1.19 01/02/2021    CR 0.94 12/10/2019    CR 1.08 11/30/2012        Recent Labs   Lab 01/02/21  0427   INR 1.11         Assessment and Plan:   64 yo F with multiple medical problems including SLE, CKD, h/o lymphoma, h/o stroke on anticoagulation for stroke prophylaxis.  S/p colonoscopy with multiple polypectomies and post polypectomy GI bleed.  This often resolves with holding the anticoagulation and does not require urgent intervention.  Agree with clear liquid diet and Hgb monitoring.  OK to restart other home meds.    Plan:  1. Admit to hospitalists  2. Surgery: non operative management  3. Diet: clear liquid  4. IV Fluids: per hospitalists  5. Antibiotics:  per hospitalists  6. Medications:  OK for home meds   7. I&O s:  strict I&O s  8. Labs:   - Reviewed: cbc, bmp  - Ordered: none   9. Imaging:   - I have reviewed the CXR  10. Activity:  up ad vilma  11. DVT prophylaxis: SCD s,     Patient specific identified risk factors considered as part of today s evaluation include: blood thinners, BMI>30    Additional history obtained from .    Total time spent on consultation: 20    Time spent on counseling and coordinating care activities: 30    Edin Lowe MD FACS FASCRS  Colorectal Surgeon       [facility]  [address], [address]  [place] [address]  T: [phone]  F: [phone]  Pager: [phone]  [email or web address]

## 2021-01-02 NOTE — ED NOTES
Bed: ED20  Expected date: 1/2/21  Expected time: 4:20 AM  Means of arrival: Ambulance  Comments:  Ashely 531 65F bloody stool, tachycardic

## 2021-01-03 VITALS
OXYGEN SATURATION: 95 % | DIASTOLIC BLOOD PRESSURE: 38 MMHG | HEIGHT: 61 IN | HEART RATE: 110 BPM | BODY MASS INDEX: 36.44 KG/M2 | SYSTOLIC BLOOD PRESSURE: 131 MMHG | TEMPERATURE: 96.1 F | RESPIRATION RATE: 16 BRPM | WEIGHT: 193 LBS

## 2021-01-03 LAB
HGB BLD-MCNC: 9.5 G/DL (ref 11.7–15.7)
HGB BLD-MCNC: 9.8 G/DL (ref 11.7–15.7)

## 2021-01-03 PROCEDURE — G0378 HOSPITAL OBSERVATION PER HR: HCPCS

## 2021-01-03 PROCEDURE — 85018 HEMOGLOBIN: CPT | Mod: 91 | Performed by: INTERNAL MEDICINE

## 2021-01-03 PROCEDURE — 96361 HYDRATE IV INFUSION ADD-ON: CPT

## 2021-01-03 PROCEDURE — 36415 COLL VENOUS BLD VENIPUNCTURE: CPT | Performed by: INTERNAL MEDICINE

## 2021-01-03 PROCEDURE — 250N000009 HC RX 250: Performed by: PHYSICIAN ASSISTANT

## 2021-01-03 PROCEDURE — 250N000013 HC RX MED GY IP 250 OP 250 PS 637: Performed by: INTERNAL MEDICINE

## 2021-01-03 RX ORDER — WARFARIN SODIUM 2.5 MG/1
TABLET ORAL
Start: 2021-01-03

## 2021-01-03 RX ADMIN — ACETAMINOPHEN 650 MG: 325 TABLET, FILM COATED ORAL at 10:12

## 2021-01-03 RX ADMIN — IPRATROPIUM BROMIDE AND ALBUTEROL SULFATE 3 ML: .5; 3 SOLUTION RESPIRATORY (INHALATION) at 10:11

## 2021-01-03 NOTE — PLAN OF CARE
Observation goals PRIOR TO DISCHARGE     Comments:   -diagnostic tests and consults completed and resulted: Met   -vital signs normal or at patient baseline: Met  Nurse to notify provider when observation goals have been met and patient is ready for discharge     Pt A&O, HR tachy, all other VSS. On RA. Hx of stroke with mild aphasia and baseline L sided weakness. Mild pain in back, prn tylenol given. No stools since admission. Last hgb 9.8. started on reg diet, tolerating. Voiding. Up A1/walker/GB. IV SL. Colorectal following and signed off. Ok for discharge home per MD. All discharge instructions, meds instructions (including to stop lovenox and restart coumadin tomorrow), and follow up recommendations reviewed with patient and  Dionte, state understanding to all. IV's removed. Escorted to door 6 via wheelchair by aid.

## 2021-01-03 NOTE — PLAN OF CARE
Observation goals PRIOR TO DISCHARGE     Comments:   -diagnostic tests and consults completed and resulted: Not met   -vital signs normal or at patient baseline: Met  Nurse to notify provider when observation goals have been met and patient is ready for discharge.

## 2021-01-03 NOTE — PROGRESS NOTES
CROSS COVER:    Paged regarding patient requesting to resume PTA PRN Benadryl at bedtime.    --Benadryl 25 mg at bedtime PRN ordered.      Dyllan Person PA-C

## 2021-01-03 NOTE — PLAN OF CARE
Goals:  -diagnostic tests and consults completed and resulted-partially met, colorectal following  -vital signs normal or at patient baseline- partially met, BP 96/48,     A&Ox2. Able to answer yes/no questions and simple answers. Hgb 11.1 --> 9.9, re-checks q 8 hrs. No stool since admission. Ambulating assist x1 with walker and GB. IV infusing. Tolerating clears.    Reported back pain this evening, tylenol given.

## 2021-01-03 NOTE — PROGRESS NOTES
CROSS COVER:    Paged regarding resuming home nebs.  PTA medications include DuoNebs QID and nursing reported wheezing on exam.    --Resume PTA DuoNeb QID.      Dyllan Person PA-C

## 2021-01-03 NOTE — PROGRESS NOTES
Colon and Rectal Surgery Progress Note          Assessment and Plan:     HD #2 post polypectomy Gi bleeding    No further BM's.  Hgb stable    I advanced her diet.  Ok to discharge later today if she eats fine.  I would restart her coumadin tomorrow.  I would not restart lovenox.      Edin Lowe MD FACS FASCRS  Colorectal Surgeon       [facility]  [address], [address]  [place] [address]  T: [phone]  F: [phone]  Pager: [phone]  [email or web address]                 Interval History:     Feels fine. Tolerating diet. Passing gas. No bm              Physical Exam:   Vitals were reviewed  Patient Vitals for the past 24 hrs:   BP Temp Temp src Pulse Resp SpO2   01/03/21 0801 (!) 131/38 96.1  F (35.6  C) Oral 108 18 93 %   01/03/21 0316 138/54 96.4  F (35.8  C) Oral 106 18 98 %   01/02/21 2338 138/55 97.4  F (36.3  C) Oral 104 16 98 %   01/02/21 1910 105/56 97.9  F (36.6  C) Oral 105 18 98 %   01/02/21 1516 96/48 97.9  F (36.6  C) Oral 104 18 97 %   01/02/21 1312 108/63 97.7  F (36.5  C) Oral 103 18 99 %         Intake/Output Summary (Last 24 hours) at 1/3/2021 1034  Last data filed at 1/3/2021 0318  Gross per 24 hour   Intake 1200 ml   Output --   Net 1200 ml       Head - Nomocephalic atraumatic  Sclera anicteric  Extremities warm and well perfused  Lungs - breathing non labored,   Abdomen - soft, non distended, non tender  Rectal exam - deferred  Skin - no rash  Psych - affect appropriate  Neuro -partial aphasia             Data:   All laboratory and imaging data in the past 24 hours reviewed    CBC  Lab Results   Component Value Date    WBC 9.3 01/02/2021    WBC 10.9 12/10/2019    HGB 9.8 (L) 01/03/2021    HGB 9.5 (L) 01/03/2021    HGB 9.9 (L) 01/02/2021    HCT 37.3 01/02/2021    HCT 40.9 12/10/2019     01/02/2021     12/10/2019       BMP  Recent Labs   Lab Test 01/02/21  0427 12/10/19  1113    134   POTASSIUM 3.7 4.1   CHLORIDE 106 101   CO2 32 29    ANIONGAP 3 4   * 103*   BUN 24 19   CR 1.19* 0.94   PATRICIA 8.7 9.8       Liver Function Studies - No results for input(s): PROTTOTAL, ALBUMIN, BILITOTAL, ALKPHOS, AST, ALT, BILIDIRECT in the last 86485 hours.                   Medications:     Current Facility-Administered Medications Ordered in Epic   Medication Dose Route Frequency Last Rate Last Admin     acetaminophen (TYLENOL) Suppository 650 mg  650 mg Rectal Q4H PRN         acetaminophen (TYLENOL) tablet 650 mg  650 mg Oral Q4H PRN   650 mg at 01/03/21 1012     diphenhydrAMINE (BENADRYL) capsule 25 mg  25 mg Oral At Bedtime PRN   25 mg at 01/02/21 2146     ipratropium - albuterol 0.5 mg/2.5 mg/3 mL (DUONEB) neb solution 3 mL  1 vial Nebulization 4x Daily   3 mL at 01/03/21 1011     melatonin tablet 3 mg  3 mg Oral At Bedtime PRN   3 mg at 01/02/21 2146     ondansetron (ZOFRAN-ODT) ODT tab 4 mg  4 mg Oral Q6H PRN        Or     ondansetron (ZOFRAN) injection 4 mg  4 mg Intravenous Q6H PRN         prochlorperazine (COMPAZINE) injection 5 mg  5 mg Intravenous Q6H PRN        Or     prochlorperazine (COMPAZINE) tablet 5 mg  5 mg Oral Q6H PRN        Or     prochlorperazine (COMPAZINE) suppository 12.5 mg  12.5 mg Rectal Q12H PRN         No current [facility]-ordered outpatient medications on file.

## 2021-01-03 NOTE — PLAN OF CARE
Goals:  -diagnostic tests and consults completed and resulted-partially met, colorectal following  -vital signs normal or at patient baseline- partially met, tachycardic

## 2021-01-03 NOTE — PLAN OF CARE
Alert, oriented to self & place. Residual aphasia from prior stroke-does well w/ yes/no questions. VSS on RA. Tolerating clears. No stool since admission. Ax1 /w walker and GB. IV LR infusing. Tylenol given for back pain. Legs nelida/scaly. Colorectal surgery following-non operative management-Hgb 9.9-q8hr checks-next @ 0800 today. Continue to monitor.

## 2021-01-03 NOTE — PROVIDER NOTIFICATION
MD Notification    Notified Person: MD    Notified Person Name: RAMAN Person    Notification Date/Time: 1/2/21 2006    Notification Interaction: web paged    Purpose of Notification: Pt requesting her PTA Benadryl @ . Can we add?     Orders Received: medication ordered

## 2021-01-03 NOTE — PROVIDER NOTIFICATION
MD Notification    Notified Person Name: RAMAN Walker    Notification Date/Time: 18:30    Notification Interaction: phone    Purpose of Notification: pt requesting neb treatment, need order    Orders Received: nebs ordered    Comments: pt and  report pt does nebs 4x daily at home. Pt having expiratory wheezes.

## 2021-01-03 NOTE — PLAN OF CARE
Goals:  -diagnostic tests and consults completed and resulted-partially met, colorectal surgery following  -vital signs normal or at patient baseline- partially met, tachycardic

## 2021-01-03 NOTE — PLAN OF CARE
Goals:  -diagnostic tests and consults completed and resulted-partially met, colorectal following  -vital signs normal or at patient baseline- partially met, slightly tachy

## 2022-12-27 ENCOUNTER — LAB REQUISITION (OUTPATIENT)
Dept: LAB | Facility: CLINIC | Age: 67
End: 2022-12-27
Payer: MEDICARE

## 2022-12-28 ENCOUNTER — LAB REQUISITION (OUTPATIENT)
Dept: LAB | Facility: CLINIC | Age: 67
End: 2022-12-28
Payer: MEDICARE

## 2022-12-28 LAB — FACT X ACT/NOR PPP CHRO: 34 % (ref 70–130)

## 2022-12-28 PROCEDURE — P9604 ONE-WAY ALLOW PRORATED TRIP: HCPCS | Performed by: INTERNAL MEDICINE

## 2022-12-28 PROCEDURE — 36415 COLL VENOUS BLD VENIPUNCTURE: CPT | Performed by: INTERNAL MEDICINE

## 2022-12-28 PROCEDURE — 85260 CLOT FACTOR X STUART-POWER: CPT | Performed by: INTERNAL MEDICINE

## 2022-12-29 LAB
ANION GAP SERPL CALCULATED.3IONS-SCNC: 13 MMOL/L (ref 7–15)
BUN SERPL-MCNC: 14.7 MG/DL (ref 8–23)
CALCIUM SERPL-MCNC: 8.6 MG/DL (ref 8.8–10.2)
CHLORIDE SERPL-SCNC: 98 MMOL/L (ref 98–107)
CREAT SERPL-MCNC: 1.16 MG/DL (ref 0.51–0.95)
DEPRECATED HCO3 PLAS-SCNC: 25 MMOL/L (ref 22–29)
ERYTHROCYTE [DISTWIDTH] IN BLOOD BY AUTOMATED COUNT: 17.2 % (ref 10–15)
GFR SERPL CREATININE-BSD FRML MDRD: 51 ML/MIN/1.73M2
GLUCOSE SERPL-MCNC: 82 MG/DL (ref 70–99)
HCT VFR BLD AUTO: 30.9 % (ref 35–47)
HGB BLD-MCNC: 9.7 G/DL (ref 11.7–15.7)
MCH RBC QN AUTO: 28.6 PG (ref 26.5–33)
MCHC RBC AUTO-ENTMCNC: 31.4 G/DL (ref 31.5–36.5)
MCV RBC AUTO: 91 FL (ref 78–100)
PLATELET # BLD AUTO: 105 10E3/UL (ref 150–450)
POTASSIUM SERPL-SCNC: 3.3 MMOL/L (ref 3.4–5.3)
RBC # BLD AUTO: 3.39 10E6/UL (ref 3.8–5.2)
SODIUM SERPL-SCNC: 136 MMOL/L (ref 136–145)
WBC # BLD AUTO: 5.1 10E3/UL (ref 4–11)

## 2022-12-29 PROCEDURE — 85048 AUTOMATED LEUKOCYTE COUNT: CPT | Performed by: NURSE PRACTITIONER

## 2022-12-29 PROCEDURE — 85014 HEMATOCRIT: CPT | Performed by: NURSE PRACTITIONER

## 2022-12-29 PROCEDURE — 82310 ASSAY OF CALCIUM: CPT | Performed by: NURSE PRACTITIONER

## 2022-12-29 PROCEDURE — 36415 COLL VENOUS BLD VENIPUNCTURE: CPT | Performed by: NURSE PRACTITIONER

## 2022-12-29 PROCEDURE — P9604 ONE-WAY ALLOW PRORATED TRIP: HCPCS | Performed by: NURSE PRACTITIONER

## 2022-12-30 LAB — FACT X ACT/NOR PPP CHRO: 41 % (ref 70–130)

## 2022-12-30 PROCEDURE — 36415 COLL VENOUS BLD VENIPUNCTURE: CPT | Performed by: INTERNAL MEDICINE

## 2022-12-30 PROCEDURE — 85260 CLOT FACTOR X STUART-POWER: CPT | Performed by: INTERNAL MEDICINE

## 2022-12-30 PROCEDURE — P9603 ONE-WAY ALLOW PRORATED MILES: HCPCS | Performed by: INTERNAL MEDICINE

## 2022-12-31 ENCOUNTER — LAB REQUISITION (OUTPATIENT)
Dept: LAB | Facility: CLINIC | Age: 67
End: 2022-12-31
Payer: MEDICARE

## 2023-01-03 LAB — FACT X ACT/NOR PPP CHRO: 44 % (ref 70–130)

## 2023-01-03 PROCEDURE — 36415 COLL VENOUS BLD VENIPUNCTURE: CPT | Performed by: INTERNAL MEDICINE

## 2023-01-03 PROCEDURE — 85260 CLOT FACTOR X STUART-POWER: CPT | Performed by: INTERNAL MEDICINE

## 2023-01-03 PROCEDURE — P9604 ONE-WAY ALLOW PRORATED TRIP: HCPCS | Performed by: INTERNAL MEDICINE

## 2023-01-04 ENCOUNTER — LAB REQUISITION (OUTPATIENT)
Dept: LAB | Facility: CLINIC | Age: 68
End: 2023-01-04
Payer: MEDICARE

## 2023-01-04 DIAGNOSIS — J21.0 ACUTE BRONCHIOLITIS DUE TO RESPIRATORY SYNCYTIAL VIRUS: ICD-10-CM

## 2023-01-06 ENCOUNTER — LAB REQUISITION (OUTPATIENT)
Dept: LAB | Facility: CLINIC | Age: 68
End: 2023-01-06
Payer: MEDICARE

## 2023-01-06 DIAGNOSIS — J21.1 ACUTE BRONCHIOLITIS DUE TO HUMAN METAPNEUMOVIRUS: ICD-10-CM

## 2023-01-09 LAB — FACT X ACT/NOR PPP CHRO: 48 % (ref 70–130)

## 2023-01-09 PROCEDURE — 85260 CLOT FACTOR X STUART-POWER: CPT | Performed by: INTERNAL MEDICINE

## 2023-01-09 PROCEDURE — 36415 COLL VENOUS BLD VENIPUNCTURE: CPT | Performed by: INTERNAL MEDICINE

## 2023-01-09 PROCEDURE — P9604 ONE-WAY ALLOW PRORATED TRIP: HCPCS | Performed by: INTERNAL MEDICINE

## 2023-01-10 ENCOUNTER — LAB REQUISITION (OUTPATIENT)
Dept: LAB | Facility: CLINIC | Age: 68
End: 2023-01-10
Payer: MEDICARE

## 2023-01-10 DIAGNOSIS — I69.320 APHASIA FOLLOWING CEREBRAL INFARCTION: ICD-10-CM

## 2023-01-10 DIAGNOSIS — J21.0 ACUTE BRONCHIOLITIS DUE TO RESPIRATORY SYNCYTIAL VIRUS: ICD-10-CM

## 2023-01-10 DIAGNOSIS — M32.9 SYSTEMIC LUPUS ERYTHEMATOSUS, UNSPECIFIED (H): ICD-10-CM

## 2023-01-10 DIAGNOSIS — J44.9 CHRONIC OBSTRUCTIVE PULMONARY DISEASE, UNSPECIFIED (H): ICD-10-CM

## 2023-01-10 DIAGNOSIS — I12.9 HYPERTENSIVE CHRONIC KIDNEY DISEASE WITH STAGE 1 THROUGH STAGE 4 CHRONIC KIDNEY DISEASE, OR UNSPECIFIED CHRONIC KIDNEY DISEASE: ICD-10-CM

## 2023-01-11 ENCOUNTER — LAB REQUISITION (OUTPATIENT)
Dept: LAB | Facility: CLINIC | Age: 68
End: 2023-01-11
Payer: MEDICARE

## 2023-01-11 DIAGNOSIS — J44.9 CHRONIC OBSTRUCTIVE PULMONARY DISEASE, UNSPECIFIED (H): ICD-10-CM

## 2023-01-11 DIAGNOSIS — J21.0 ACUTE BRONCHIOLITIS DUE TO RESPIRATORY SYNCYTIAL VIRUS: ICD-10-CM

## 2023-01-12 PROCEDURE — P9604 ONE-WAY ALLOW PRORATED TRIP: HCPCS | Performed by: INTERNAL MEDICINE

## 2023-01-12 PROCEDURE — 85260 CLOT FACTOR X STUART-POWER: CPT | Performed by: INTERNAL MEDICINE

## 2023-01-12 PROCEDURE — 36415 COLL VENOUS BLD VENIPUNCTURE: CPT | Performed by: INTERNAL MEDICINE

## 2023-01-13 ENCOUNTER — LAB REQUISITION (OUTPATIENT)
Dept: LAB | Facility: CLINIC | Age: 68
End: 2023-01-13
Payer: MEDICARE

## 2023-01-13 DIAGNOSIS — M32.9 SYSTEMIC LUPUS ERYTHEMATOSUS, UNSPECIFIED (H): ICD-10-CM

## 2023-01-13 LAB
FACT X ACT/NOR PPP CHRO: 41 % (ref 70–130)
FACT X ACT/NOR PPP CHRO: 44 % (ref 70–130)

## 2023-01-13 PROCEDURE — P9604 ONE-WAY ALLOW PRORATED TRIP: HCPCS | Performed by: INTERNAL MEDICINE

## 2023-01-13 PROCEDURE — 36415 COLL VENOUS BLD VENIPUNCTURE: CPT | Performed by: INTERNAL MEDICINE

## 2023-01-13 PROCEDURE — 85260 CLOT FACTOR X STUART-POWER: CPT | Performed by: INTERNAL MEDICINE

## 2024-03-06 ENCOUNTER — ANESTHESIA EVENT (OUTPATIENT)
Dept: SURGERY | Facility: CLINIC | Age: 69
End: 2024-03-06
Payer: MEDICARE

## 2024-03-07 ENCOUNTER — HOSPITAL ENCOUNTER (OUTPATIENT)
Facility: CLINIC | Age: 69
Discharge: HOME | End: 2024-03-07
Attending: INTERNAL MEDICINE | Admitting: INTERNAL MEDICINE
Payer: MEDICARE

## 2024-03-07 ENCOUNTER — ANESTHESIA (OUTPATIENT)
Dept: SURGERY | Facility: CLINIC | Age: 69
End: 2024-03-07
Payer: MEDICARE

## 2024-03-07 VITALS
TEMPERATURE: 97.4 F | DIASTOLIC BLOOD PRESSURE: 58 MMHG | WEIGHT: 160 LBS | RESPIRATION RATE: 16 BRPM | OXYGEN SATURATION: 94 % | SYSTOLIC BLOOD PRESSURE: 120 MMHG | HEIGHT: 61 IN | HEART RATE: 99 BPM | BODY MASS INDEX: 30.21 KG/M2

## 2024-03-07 LAB
COLONOSCOPY: NORMAL
INR PPP: 1.08 (ref 0.85–1.15)

## 2024-03-07 PROCEDURE — 258N000003 HC RX IP 258 OP 636: Performed by: NURSE ANESTHETIST, CERTIFIED REGISTERED

## 2024-03-07 PROCEDURE — 36415 COLL VENOUS BLD VENIPUNCTURE: CPT | Performed by: INTERNAL MEDICINE

## 2024-03-07 PROCEDURE — 370N000017 HC ANESTHESIA TECHNICAL FEE, PER MIN: Performed by: INTERNAL MEDICINE

## 2024-03-07 PROCEDURE — 360N000075 HC SURGERY LEVEL 2, PER MIN: Performed by: INTERNAL MEDICINE

## 2024-03-07 PROCEDURE — 710N000012 HC RECOVERY PHASE 2, PER MINUTE: Performed by: INTERNAL MEDICINE

## 2024-03-07 PROCEDURE — 85610 PROTHROMBIN TIME: CPT | Performed by: INTERNAL MEDICINE

## 2024-03-07 PROCEDURE — 250N000011 HC RX IP 250 OP 636: Performed by: NURSE ANESTHETIST, CERTIFIED REGISTERED

## 2024-03-07 PROCEDURE — 258N000003 HC RX IP 258 OP 636: Performed by: ANESTHESIOLOGY

## 2024-03-07 PROCEDURE — 999N000141 HC STATISTIC PRE-PROCEDURE NURSING ASSESSMENT: Performed by: INTERNAL MEDICINE

## 2024-03-07 PROCEDURE — 250N000009 HC RX 250: Performed by: NURSE ANESTHETIST, CERTIFIED REGISTERED

## 2024-03-07 RX ORDER — OXYCODONE HYDROCHLORIDE 5 MG/1
10 TABLET ORAL EVERY 4 HOURS PRN
Status: DISCONTINUED | OUTPATIENT
Start: 2024-03-07 | End: 2024-03-07 | Stop reason: HOSPADM

## 2024-03-07 RX ORDER — NALOXONE HYDROCHLORIDE 0.4 MG/ML
0.1 INJECTION, SOLUTION INTRAMUSCULAR; INTRAVENOUS; SUBCUTANEOUS
Status: DISCONTINUED | OUTPATIENT
Start: 2024-03-07 | End: 2024-03-07 | Stop reason: HOSPADM

## 2024-03-07 RX ORDER — HYDROMORPHONE HCL IN WATER/PF 6 MG/30 ML
0.2 PATIENT CONTROLLED ANALGESIA SYRINGE INTRAVENOUS EVERY 5 MIN PRN
Status: DISCONTINUED | OUTPATIENT
Start: 2024-03-07 | End: 2024-03-07 | Stop reason: HOSPADM

## 2024-03-07 RX ORDER — ONDANSETRON 2 MG/ML
4 INJECTION INTRAMUSCULAR; INTRAVENOUS EVERY 30 MIN PRN
Status: DISCONTINUED | OUTPATIENT
Start: 2024-03-07 | End: 2024-03-07 | Stop reason: HOSPADM

## 2024-03-07 RX ORDER — NALOXONE HYDROCHLORIDE 0.4 MG/ML
0.2 INJECTION, SOLUTION INTRAMUSCULAR; INTRAVENOUS; SUBCUTANEOUS
Status: DISCONTINUED | OUTPATIENT
Start: 2024-03-07 | End: 2024-03-07 | Stop reason: HOSPADM

## 2024-03-07 RX ORDER — FENTANYL CITRATE 50 UG/ML
25 INJECTION, SOLUTION INTRAMUSCULAR; INTRAVENOUS
Status: DISCONTINUED | OUTPATIENT
Start: 2024-03-07 | End: 2024-03-07 | Stop reason: HOSPADM

## 2024-03-07 RX ORDER — ONDANSETRON 4 MG/1
4 TABLET, ORALLY DISINTEGRATING ORAL EVERY 30 MIN PRN
Status: DISCONTINUED | OUTPATIENT
Start: 2024-03-07 | End: 2024-03-07 | Stop reason: HOSPADM

## 2024-03-07 RX ORDER — FLUMAZENIL 0.1 MG/ML
0.2 INJECTION, SOLUTION INTRAVENOUS
Status: DISCONTINUED | OUTPATIENT
Start: 2024-03-07 | End: 2024-03-07 | Stop reason: HOSPADM

## 2024-03-07 RX ORDER — HYDROMORPHONE HCL IN WATER/PF 6 MG/30 ML
0.4 PATIENT CONTROLLED ANALGESIA SYRINGE INTRAVENOUS EVERY 5 MIN PRN
Status: DISCONTINUED | OUTPATIENT
Start: 2024-03-07 | End: 2024-03-07 | Stop reason: HOSPADM

## 2024-03-07 RX ORDER — LIDOCAINE 40 MG/G
CREAM TOPICAL
Status: DISCONTINUED | OUTPATIENT
Start: 2024-03-07 | End: 2024-03-07 | Stop reason: HOSPADM

## 2024-03-07 RX ORDER — SODIUM CHLORIDE, SODIUM LACTATE, POTASSIUM CHLORIDE, CALCIUM CHLORIDE 600; 310; 30; 20 MG/100ML; MG/100ML; MG/100ML; MG/100ML
INJECTION, SOLUTION INTRAVENOUS CONTINUOUS
Status: DISCONTINUED | OUTPATIENT
Start: 2024-03-07 | End: 2024-03-07 | Stop reason: HOSPADM

## 2024-03-07 RX ORDER — FENTANYL CITRATE 50 UG/ML
50 INJECTION, SOLUTION INTRAMUSCULAR; INTRAVENOUS EVERY 5 MIN PRN
Status: DISCONTINUED | OUTPATIENT
Start: 2024-03-07 | End: 2024-03-07 | Stop reason: HOSPADM

## 2024-03-07 RX ORDER — ONDANSETRON 4 MG/1
4 TABLET, ORALLY DISINTEGRATING ORAL EVERY 6 HOURS PRN
Status: DISCONTINUED | OUTPATIENT
Start: 2024-03-07 | End: 2024-03-07 | Stop reason: HOSPADM

## 2024-03-07 RX ORDER — FENTANYL CITRATE 50 UG/ML
25 INJECTION, SOLUTION INTRAMUSCULAR; INTRAVENOUS EVERY 5 MIN PRN
Status: DISCONTINUED | OUTPATIENT
Start: 2024-03-07 | End: 2024-03-07 | Stop reason: HOSPADM

## 2024-03-07 RX ORDER — LIDOCAINE HYDROCHLORIDE 10 MG/ML
INJECTION, SOLUTION INFILTRATION; PERINEURAL PRN
Status: DISCONTINUED | OUTPATIENT
Start: 2024-03-07 | End: 2024-03-07

## 2024-03-07 RX ORDER — ONDANSETRON 2 MG/ML
4 INJECTION INTRAMUSCULAR; INTRAVENOUS EVERY 6 HOURS PRN
Status: DISCONTINUED | OUTPATIENT
Start: 2024-03-07 | End: 2024-03-07 | Stop reason: HOSPADM

## 2024-03-07 RX ORDER — PROPOFOL 10 MG/ML
INJECTION, EMULSION INTRAVENOUS CONTINUOUS PRN
Status: DISCONTINUED | OUTPATIENT
Start: 2024-03-07 | End: 2024-03-07

## 2024-03-07 RX ORDER — PROPOFOL 10 MG/ML
INJECTION, EMULSION INTRAVENOUS PRN
Status: DISCONTINUED | OUTPATIENT
Start: 2024-03-07 | End: 2024-03-07

## 2024-03-07 RX ORDER — NALOXONE HYDROCHLORIDE 0.4 MG/ML
0.4 INJECTION, SOLUTION INTRAMUSCULAR; INTRAVENOUS; SUBCUTANEOUS
Status: DISCONTINUED | OUTPATIENT
Start: 2024-03-07 | End: 2024-03-07 | Stop reason: HOSPADM

## 2024-03-07 RX ORDER — ONDANSETRON 2 MG/ML
INJECTION INTRAMUSCULAR; INTRAVENOUS PRN
Status: DISCONTINUED | OUTPATIENT
Start: 2024-03-07 | End: 2024-03-07

## 2024-03-07 RX ORDER — ONDANSETRON 2 MG/ML
4 INJECTION INTRAMUSCULAR; INTRAVENOUS
Status: DISCONTINUED | OUTPATIENT
Start: 2024-03-07 | End: 2024-03-07 | Stop reason: HOSPADM

## 2024-03-07 RX ORDER — PROCHLORPERAZINE MALEATE 5 MG
5 TABLET ORAL EVERY 6 HOURS PRN
Status: DISCONTINUED | OUTPATIENT
Start: 2024-03-07 | End: 2024-03-07 | Stop reason: HOSPADM

## 2024-03-07 RX ORDER — OXYCODONE HYDROCHLORIDE 5 MG/1
5 TABLET ORAL EVERY 4 HOURS PRN
Status: DISCONTINUED | OUTPATIENT
Start: 2024-03-07 | End: 2024-03-07 | Stop reason: HOSPADM

## 2024-03-07 RX ADMIN — PROPOFOL 30 MG: 10 INJECTION, EMULSION INTRAVENOUS at 08:00

## 2024-03-07 RX ADMIN — ONDANSETRON 4 MG: 2 INJECTION INTRAMUSCULAR; INTRAVENOUS at 08:03

## 2024-03-07 RX ADMIN — PHENYLEPHRINE HYDROCHLORIDE 100 MCG: 10 INJECTION INTRAVENOUS at 08:10

## 2024-03-07 RX ADMIN — PHENYLEPHRINE HYDROCHLORIDE 200 MCG: 10 INJECTION INTRAVENOUS at 08:12

## 2024-03-07 RX ADMIN — PROPOFOL 100 MCG/KG/MIN: 10 INJECTION, EMULSION INTRAVENOUS at 08:00

## 2024-03-07 RX ADMIN — LIDOCAINE HYDROCHLORIDE 2 ML: 10 INJECTION, SOLUTION INFILTRATION; PERINEURAL at 08:00

## 2024-03-07 RX ADMIN — SODIUM CHLORIDE, POTASSIUM CHLORIDE, SODIUM LACTATE AND CALCIUM CHLORIDE: 600; 310; 30; 20 INJECTION, SOLUTION INTRAVENOUS at 07:13

## 2024-03-07 RX ADMIN — PHENYLEPHRINE HYDROCHLORIDE 100 MCG: 10 INJECTION INTRAVENOUS at 08:08

## 2024-03-07 ASSESSMENT — ACTIVITIES OF DAILY LIVING (ADL)
ADLS_ACUITY_SCORE: 38
ADLS_ACUITY_SCORE: 38

## 2024-03-07 ASSESSMENT — COPD QUESTIONNAIRES: COPD: 1

## 2024-03-07 NOTE — ANESTHESIA CARE TRANSFER NOTE
Patient: Sho Hernandez    Procedure: Procedure(s):  COLONOSCOPY       Diagnosis: Screen for colon cancer [Z12.11]  Diagnosis Additional Information: No value filed.    Anesthesia Type:   MAC     Note:    Oropharynx: oropharynx clear of all foreign objects  Level of Consciousness: awake  Oxygen Supplementation: room air    Independent Airway: airway patency satisfactory and stable  Dentition: dentition unchanged  Vital Signs Stable: post-procedure vital signs reviewed and stable  Report to RN Given: handoff report given  Patient transferred to: Phase II    Handoff Report: Identifed the Patient, Identified the Reponsible Provider, Reviewed the pertinent medical history, Discussed the surgical course, Reviewed Intra-OP anesthesia mangement and issues during anesthesia, Set expectations for post-procedure period and Allowed opportunity for questions and acknowledgement of understanding      Vitals:  Vitals Value Taken Time   /51    Temp 97.5    Pulse 82    Resp 14    SpO2 100        Electronically Signed By: MYRA Silver CRNA  March 7, 2024  8:19 AM

## 2024-03-07 NOTE — ANESTHESIA PREPROCEDURE EVALUATION
Anesthesia Pre-Procedure Evaluation    Patient: Sho Hernandez   MRN: [id number] : 1955        Procedure : Procedure(s):  COLONOSCOPY          Past Medical History:   Diagnosis Date    Arthritis     Cancer (H) 2014    lung    Cerebral infarction (H)     Congestive heart failure (H)     COPD (chronic obstructive pulmonary disease) (H)     Heart disease     Hypertension     Obese     Thyroid disease     Uncomplicated asthma       Past Surgical History:   Procedure Laterality Date    COLONOSCOPY N/A 2020    Procedure: Colonoscopy, With Polypectomy And Biopsy;  Surgeon: Evelina Rushing MD;  Location: [facility]    IR LUNG BIOPSY MEDIASTINUM RIGHT  3/5/2014    THORACIC SURGERY  2014    2 sections of right lung      Allergies   Allergen Reactions    Levaquin [Levofloxacin]     Penicillins Unknown      Social History     Tobacco Use    Smoking status: Former     Packs/day: .5     Types: Cigarettes     Start date: 2014    Smokeless tobacco: Never    Tobacco comments:     5 cigarettes per day - 14: has quit completely   Substance Use Topics    Alcohol use: No      Wt Readings from Last 1 Encounters:   24 72.6 kg (160 lb)        Anesthesia Evaluation   Pt has had prior anesthetic.     No history of anesthetic complications       ROS/MED HX  ENT/Pulmonary:     (+)                      asthma    COPD,              Neurologic:     (+)          CVA,                      Cardiovascular:     (+) Dyslipidemia hypertension- -   -  - -      CHF (diastolic dysfunction)                                METS/Exercise Tolerance:     Hematologic:     (+)      anemia,          Musculoskeletal: Comment: RA, lupus      GI/Hepatic:       Renal/Genitourinary:     (+) renal disease, type: CRI,            Endo:     (+)          thyroid problem, hypothyroidism,           Psychiatric/Substance Use:       Infectious Disease:       Malignancy:   (+) Malignancy, History of Lung and Lymphoma/Leukemia.   "  Other:            Physical Exam    Airway        Mallampati: II       Respiratory Devices and Support         Dental       (+) Minor Abnormalities - some fillings, tiny chips      Cardiovascular   cardiovascular exam normal          Pulmonary   pulmonary exam normal                OUTSIDE LABS:  CBC:   Lab Results   Component Value Date    WBC 5.1 12/29/2022    WBC 9.3 01/02/2021    HGB 9.7 (L) 12/29/2022    HGB 9.8 (L) 01/03/2021    HCT 30.9 (L) 12/29/2022    HCT 37.3 01/02/2021     (L) 12/29/2022     01/02/2021     BMP:   Lab Results   Component Value Date     12/29/2022     01/02/2021    POTASSIUM 3.3 (L) 12/29/2022    POTASSIUM 3.7 01/02/2021    CHLORIDE 98 12/29/2022    CHLORIDE 106 01/02/2021    CO2 25 12/29/2022    CO2 32 01/02/2021    BUN 14.7 12/29/2022    BUN 24 01/02/2021    CR 1.16 (H) 12/29/2022    CR 1.19 (H) 01/02/2021    GLC 82 12/29/2022     (H) 01/02/2021     COAGS:   Lab Results   Component Value Date    PTT 36 01/02/2021    INR 1.11 01/02/2021     POC: No results found for: \"BGM\", \"HCG\", \"HCGS\"  HEPATIC: No results found for: \"ALBUMIN\", \"PROTTOTAL\", \"ALT\", \"AST\", \"GGT\", \"ALKPHOS\", \"BILITOTAL\", \"BILIDIRECT\", \"LOUIE\"  OTHER:   Lab Results   Component Value Date    PATRICIA 8.6 (L) 12/29/2022       Anesthesia Plan    ASA Status:  3    NPO Status:  NPO Appropriate    Anesthesia Type: MAC.              Consents    Anesthesia Plan(s) and associated risks, benefits, and realistic alternatives discussed. Questions answered and patient/representative(s) expressed understanding.     - Discussed:     - Discussed with:  Patient            Postoperative Care    Pain management: Multi-modal analgesia.   PONV prophylaxis: Ondansetron (or other 5HT-3)     Comments:               MAGY WILLARD MD    I have reviewed the pertinent notes and labs in the chart from the past 30 days and (re)examined the patient.  Any updates or changes from those notes are reflected in this " "note.            # Drug Induced Coagulation Defect: home medication list includes an anticoagulant medication   # Obesity: Estimated body mass index is 30.23 kg/m  as calculated from the following:    Height as of this encounter: 1.549 m (5' 1\").    Weight as of this encounter: 72.6 kg (160 lb).      "

## 2024-03-07 NOTE — ANESTHESIA POSTPROCEDURE EVALUATION
Patient: Sho Hernandez    Procedure: Procedure(s):  COLONOSCOPY       Anesthesia Type:  MAC    Note:  Disposition: Outpatient   Postop Pain Control: Uneventful            Sign Out: Well controlled pain   PONV: No   Neuro/Psych: Uneventful            Sign Out: Acceptable/Baseline neuro status   Airway/Respiratory: Uneventful            Sign Out: Acceptable/Baseline resp. status   CV/Hemodynamics: Uneventful            Sign Out: Acceptable CV status; No obvious hypovolemia; No obvious fluid overload   Other NRE: NONE   DID A NON-ROUTINE EVENT OCCUR? No           Last vitals:  Vitals Value Taken Time   /58 03/07/24 0857   Temp 36.3  C (97.4  F) 03/07/24 0830   Pulse 99 03/07/24 0857   Resp 16 03/07/24 0857   SpO2 94 % 03/07/24 0857       Electronically Signed By: MAGY WILLARD MD  March 7, 2024  2:36 PM

## 2024-03-07 NOTE — H&P
"  GASTROENTEROLOGY PRE-PROCEDURAL HISTORY AND PHYSICAL     INDICATION FOR PROCEDURE   Personal history of colon polyps     HISTORY    Reviewed and no change.     PHYSICAL EXAMINATION     Vitals Blood pressure (!) 141/71, pulse 120, temperature 97.4  F (36.3  C), temperature source Temporal, resp. rate 18, height 1.549 m (5' 1\"), weight 72.6 kg (160 lb), SpO2 94%.          Physical Exam  General: awake, alert, oriented times three   Cardiovascular: RRR, no edema   Airway: Normal   Chest: lungs are clear to auscultation bilaterally   Abdomen: soft, non-tender        PREVIOUS REACTION TO SEDATION/ANESTHESIA    None     SEDATION PLAN BASED ON ASSESSMENT   Per Anesthesia     ASA CLASSIFICATION   ASA Classification: 3     MALLAMPATI SCORE     Class II      IMPRESSION   Patient deemed adequate candidate for anesthesia.     PLANNED PROCEDURE   Colonoscopy     Jigar Rogers MD [facility]  I appreciate the opportunity to participate in the care of this patient.   Please feel free to call me with any questions or concerns.     "

## 2024-03-15 ENCOUNTER — DOCUMENTATION ONLY (OUTPATIENT)
Dept: OTHER | Facility: CLINIC | Age: 69
End: 2024-03-15
Payer: MEDICARE

## 2025-01-16 ENCOUNTER — LAB REQUISITION (OUTPATIENT)
Dept: LAB | Facility: CLINIC | Age: 70
End: 2025-01-16
Payer: MEDICARE

## 2025-01-16 DIAGNOSIS — R79.1 ABNORMAL COAGULATION PROFILE: ICD-10-CM

## 2025-01-17 LAB — INR PPP: 2.68 (ref 0.85–1.15)

## 2025-01-17 PROCEDURE — 36415 COLL VENOUS BLD VENIPUNCTURE: CPT | Mod: ORL | Performed by: NURSE PRACTITIONER

## 2025-01-17 PROCEDURE — 85610 PROTHROMBIN TIME: CPT | Mod: ORL | Performed by: NURSE PRACTITIONER

## 2025-01-17 PROCEDURE — P9604 ONE-WAY ALLOW PRORATED TRIP: HCPCS | Mod: ORL | Performed by: NURSE PRACTITIONER

## 2025-01-20 ENCOUNTER — LAB REQUISITION (OUTPATIENT)
Dept: LAB | Facility: CLINIC | Age: 70
End: 2025-01-20
Payer: MEDICARE

## 2025-01-20 DIAGNOSIS — R79.1 ABNORMAL COAGULATION PROFILE: ICD-10-CM

## 2025-01-23 LAB — INR PPP: 3.84 (ref 0.85–1.15)

## 2025-01-23 PROCEDURE — P9604 ONE-WAY ALLOW PRORATED TRIP: HCPCS | Mod: ORL | Performed by: NURSE PRACTITIONER

## 2025-01-23 PROCEDURE — 85610 PROTHROMBIN TIME: CPT | Mod: ORL | Performed by: NURSE PRACTITIONER

## 2025-01-23 PROCEDURE — 36415 COLL VENOUS BLD VENIPUNCTURE: CPT | Mod: ORL | Performed by: NURSE PRACTITIONER

## 2025-01-24 ENCOUNTER — LAB REQUISITION (OUTPATIENT)
Dept: LAB | Facility: CLINIC | Age: 70
End: 2025-01-24
Payer: MEDICARE

## 2025-01-24 DIAGNOSIS — I63.40 CEREBRAL INFARCTION DUE TO EMBOLISM OF UNSPECIFIED CEREBRAL ARTERY (H): ICD-10-CM

## 2025-01-27 LAB — INR PPP: 3.06 (ref 0.85–1.15)

## 2025-01-27 PROCEDURE — 85610 PROTHROMBIN TIME: CPT | Mod: ORL | Performed by: NURSE PRACTITIONER

## 2025-01-27 PROCEDURE — P9603 ONE-WAY ALLOW PRORATED MILES: HCPCS | Mod: ORL | Performed by: NURSE PRACTITIONER

## 2025-01-27 PROCEDURE — 36415 COLL VENOUS BLD VENIPUNCTURE: CPT | Mod: ORL | Performed by: NURSE PRACTITIONER

## 2025-01-29 ENCOUNTER — LAB REQUISITION (OUTPATIENT)
Dept: LAB | Facility: CLINIC | Age: 70
End: 2025-01-29
Payer: MEDICARE

## 2025-01-29 DIAGNOSIS — R79.1 ABNORMAL COAGULATION PROFILE: ICD-10-CM

## 2025-01-30 LAB — INR PPP: 4.38 (ref 0.85–1.15)

## 2025-01-30 PROCEDURE — 85610 PROTHROMBIN TIME: CPT | Mod: ORL | Performed by: NURSE PRACTITIONER

## 2025-01-30 PROCEDURE — P9604 ONE-WAY ALLOW PRORATED TRIP: HCPCS | Mod: ORL | Performed by: NURSE PRACTITIONER

## 2025-01-30 PROCEDURE — 36415 COLL VENOUS BLD VENIPUNCTURE: CPT | Mod: ORL | Performed by: NURSE PRACTITIONER

## 2025-01-31 ENCOUNTER — LAB REQUISITION (OUTPATIENT)
Dept: LAB | Facility: CLINIC | Age: 70
End: 2025-01-31
Payer: MEDICARE

## 2025-01-31 DIAGNOSIS — R79.1 ABNORMAL COAGULATION PROFILE: ICD-10-CM

## 2025-02-03 ENCOUNTER — LAB REQUISITION (OUTPATIENT)
Dept: LAB | Facility: CLINIC | Age: 70
End: 2025-02-03
Payer: MEDICARE

## 2025-02-03 DIAGNOSIS — Z86.73 PERSONAL HISTORY OF TRANSIENT ISCHEMIC ATTACK (TIA), AND CEREBRAL INFARCTION WITHOUT RESIDUAL DEFICITS: ICD-10-CM

## 2025-02-03 LAB — INR PPP: 4.49 (ref 0.85–1.15)

## 2025-02-03 PROCEDURE — 36415 COLL VENOUS BLD VENIPUNCTURE: CPT | Mod: ORL | Performed by: NURSE PRACTITIONER

## 2025-02-03 PROCEDURE — P9604 ONE-WAY ALLOW PRORATED TRIP: HCPCS | Mod: ORL | Performed by: NURSE PRACTITIONER

## 2025-02-03 PROCEDURE — 85610 PROTHROMBIN TIME: CPT | Mod: ORL | Performed by: NURSE PRACTITIONER

## 2025-02-05 LAB — INR PPP: 3.55 (ref 0.85–1.15)

## 2025-02-05 PROCEDURE — P9604 ONE-WAY ALLOW PRORATED TRIP: HCPCS | Mod: ORL | Performed by: NURSE PRACTITIONER

## 2025-02-05 PROCEDURE — 85610 PROTHROMBIN TIME: CPT | Mod: ORL | Performed by: NURSE PRACTITIONER

## 2025-02-05 PROCEDURE — 36415 COLL VENOUS BLD VENIPUNCTURE: CPT | Mod: ORL | Performed by: NURSE PRACTITIONER

## 2025-02-06 ENCOUNTER — LAB REQUISITION (OUTPATIENT)
Dept: LAB | Facility: CLINIC | Age: 70
End: 2025-02-06
Payer: MEDICARE

## 2025-02-06 DIAGNOSIS — I48.91 UNSPECIFIED ATRIAL FIBRILLATION (H): ICD-10-CM

## 2025-02-07 ENCOUNTER — LAB REQUISITION (OUTPATIENT)
Dept: LAB | Facility: CLINIC | Age: 70
End: 2025-02-07
Payer: MEDICARE

## 2025-02-07 DIAGNOSIS — Z51.81 ENCOUNTER FOR THERAPEUTIC DRUG LEVEL MONITORING: ICD-10-CM

## 2025-02-07 LAB — INR PPP: 2.12 (ref 0.85–1.15)

## 2025-02-07 PROCEDURE — 85610 PROTHROMBIN TIME: CPT | Mod: ORL | Performed by: NURSE PRACTITIONER

## 2025-02-07 PROCEDURE — P9604 ONE-WAY ALLOW PRORATED TRIP: HCPCS | Mod: ORL | Performed by: NURSE PRACTITIONER

## 2025-02-07 PROCEDURE — 36415 COLL VENOUS BLD VENIPUNCTURE: CPT | Mod: ORL | Performed by: NURSE PRACTITIONER

## 2025-02-10 LAB — INR PPP: 2.01 (ref 0.85–1.15)

## 2025-02-10 PROCEDURE — 36415 COLL VENOUS BLD VENIPUNCTURE: CPT | Mod: ORL | Performed by: EMERGENCY MEDICINE

## 2025-02-10 PROCEDURE — 85610 PROTHROMBIN TIME: CPT | Mod: ORL | Performed by: EMERGENCY MEDICINE

## 2025-02-10 PROCEDURE — P9604 ONE-WAY ALLOW PRORATED TRIP: HCPCS | Mod: ORL | Performed by: EMERGENCY MEDICINE

## 2025-02-12 ENCOUNTER — LAB REQUISITION (OUTPATIENT)
Dept: LAB | Facility: CLINIC | Age: 70
End: 2025-02-12
Payer: MEDICARE

## 2025-02-12 DIAGNOSIS — Z86.73 PERSONAL HISTORY OF TRANSIENT ISCHEMIC ATTACK (TIA), AND CEREBRAL INFARCTION WITHOUT RESIDUAL DEFICITS: ICD-10-CM

## 2025-02-17 LAB — INR PPP: 4.63 (ref 0.85–1.15)

## 2025-02-17 PROCEDURE — P9604 ONE-WAY ALLOW PRORATED TRIP: HCPCS | Mod: ORL | Performed by: NURSE PRACTITIONER

## 2025-02-17 PROCEDURE — 85610 PROTHROMBIN TIME: CPT | Mod: ORL | Performed by: NURSE PRACTITIONER

## 2025-02-17 PROCEDURE — 36415 COLL VENOUS BLD VENIPUNCTURE: CPT | Mod: ORL | Performed by: NURSE PRACTITIONER

## (undated) RX ORDER — ONDANSETRON 2 MG/ML
INJECTION INTRAMUSCULAR; INTRAVENOUS
Status: DISPENSED
Start: 2024-03-07

## (undated) RX ORDER — PROPOFOL 10 MG/ML
INJECTION, EMULSION INTRAVENOUS
Status: DISPENSED
Start: 2024-03-07

## (undated) RX ORDER — FENTANYL CITRATE-0.9 % NACL/PF 10 MCG/ML
PLASTIC BAG, INJECTION (ML) INTRAVENOUS
Status: DISPENSED
Start: 2024-03-07

## (undated) RX ORDER — LIDOCAINE HYDROCHLORIDE 10 MG/ML
INJECTION, SOLUTION EPIDURAL; INFILTRATION; INTRACAUDAL; PERINEURAL
Status: DISPENSED
Start: 2024-03-07